# Patient Record
Sex: MALE | Race: WHITE | Employment: FULL TIME | URBAN - METROPOLITAN AREA
[De-identification: names, ages, dates, MRNs, and addresses within clinical notes are randomized per-mention and may not be internally consistent; named-entity substitution may affect disease eponyms.]

---

## 2020-01-28 ENCOUNTER — OFFICE VISIT (OUTPATIENT)
Dept: CARDIOLOGY CLINIC | Age: 50
End: 2020-01-28
Payer: COMMERCIAL

## 2020-01-28 VITALS
WEIGHT: 132 LBS | SYSTOLIC BLOOD PRESSURE: 122 MMHG | DIASTOLIC BLOOD PRESSURE: 75 MMHG | BODY MASS INDEX: 22.53 KG/M2 | HEIGHT: 64 IN | HEART RATE: 67 BPM

## 2020-01-28 PROCEDURE — 99203 OFFICE O/P NEW LOW 30 MIN: CPT | Performed by: INTERNAL MEDICINE

## 2020-01-28 NOTE — PROGRESS NOTES
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 He has had several episodes at work when he had mild headache and when he checked his BP, he noted high diastolic readings of 85 to 90 mmHg. He has been trying to lose weight and has been on a diet and lost 3 lbs in last 2 weeks. He denies having any chest pain, however has experienced occasional SOB with strenuous activities. He thinks this could be related to deconditioning and weight gain. Denies any syncope. No history of Etoh or smoking. Denies any LE swelling. He has had colonoscopy screening last year which was normal.     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 He is physically active and practices full time. He participate in Meditation sessions regularly. PMH:   None      PSH:   None     Allergies: Allergies not on file    Social History:  Denies smoking or alcohol use. Family History:  Reviewed. Father had CAD at age 76s and HTN. Mother had high triglyceride. Review of System:  All other systems reviewed except for that noted above.  Pertinent negatives and positives are:       General: negative for fever, chills    Ophthalmic ROS: negative for - eye pain or loss of vision   ENT ROS: negative for - headaches, sore throat    Respiratory: negative for - cough, sputum   Cardiovascular: Reviewed in HPI   Gastrointestinal: negative for - abdominal pain, diarrhea, N/V   Hematology: negative for - bleeding, blood clots, bruising or jaundice   Genito-Urinary:  negative for - Dysuria or incontinence   Musculoskeletal: negative for - Joint swelling, muscle pain   Neurological: negative for - confusion, dizziness, headaches    Psychiatric: No anxiety,

## 2020-02-07 ENCOUNTER — OFFICE VISIT (OUTPATIENT)
Dept: INTERNAL MEDICINE CLINIC | Age: 50
End: 2020-02-07
Payer: COMMERCIAL

## 2020-02-07 VITALS
HEART RATE: 63 BPM | SYSTOLIC BLOOD PRESSURE: 134 MMHG | WEIGHT: 137 LBS | BODY MASS INDEX: 23.39 KG/M2 | HEIGHT: 64 IN | DIASTOLIC BLOOD PRESSURE: 82 MMHG

## 2020-02-07 DIAGNOSIS — R03.0 ELEVATED BLOOD PRESSURE READING IN OFFICE WITHOUT DIAGNOSIS OF HYPERTENSION: ICD-10-CM

## 2020-02-07 DIAGNOSIS — Z13.1 DIABETES MELLITUS SCREENING: ICD-10-CM

## 2020-02-07 DIAGNOSIS — Z12.5 PROSTATE CANCER SCREENING: ICD-10-CM

## 2020-02-07 DIAGNOSIS — Z00.00 ROUTINE ADULT HEALTH MAINTENANCE: ICD-10-CM

## 2020-02-07 LAB
A/G RATIO: 1.9 (ref 1.1–2.2)
ALBUMIN SERPL-MCNC: 4.8 G/DL (ref 3.4–5)
ALP BLD-CCNC: 73 U/L (ref 40–129)
ALT SERPL-CCNC: 25 U/L (ref 10–40)
ANION GAP SERPL CALCULATED.3IONS-SCNC: 12 MMOL/L (ref 3–16)
AST SERPL-CCNC: 18 U/L (ref 15–37)
BASOPHILS ABSOLUTE: 0 K/UL (ref 0–0.2)
BASOPHILS RELATIVE PERCENT: 0.7 %
BILIRUB SERPL-MCNC: 0.4 MG/DL (ref 0–1)
BUN BLDV-MCNC: 13 MG/DL (ref 7–20)
CALCIUM SERPL-MCNC: 9.6 MG/DL (ref 8.3–10.6)
CHLORIDE BLD-SCNC: 101 MMOL/L (ref 99–110)
CHOLESTEROL, TOTAL: 237 MG/DL (ref 0–199)
CO2: 26 MMOL/L (ref 21–32)
CREAT SERPL-MCNC: 0.7 MG/DL (ref 0.9–1.3)
EOSINOPHILS ABSOLUTE: 0.1 K/UL (ref 0–0.6)
EOSINOPHILS RELATIVE PERCENT: 1.8 %
GFR AFRICAN AMERICAN: >60
GFR NON-AFRICAN AMERICAN: >60
GLOBULIN: 2.5 G/DL
GLUCOSE BLD-MCNC: 89 MG/DL (ref 70–99)
HCT VFR BLD CALC: 45.5 % (ref 40.5–52.5)
HDLC SERPL-MCNC: 44 MG/DL (ref 40–60)
HEMOGLOBIN: 15.4 G/DL (ref 13.5–17.5)
LDL CHOLESTEROL CALCULATED: 164 MG/DL
LYMPHOCYTES ABSOLUTE: 2.5 K/UL (ref 1–5.1)
LYMPHOCYTES RELATIVE PERCENT: 39.1 %
MCH RBC QN AUTO: 30.2 PG (ref 26–34)
MCHC RBC AUTO-ENTMCNC: 33.7 G/DL (ref 31–36)
MCV RBC AUTO: 89.4 FL (ref 80–100)
MONOCYTES ABSOLUTE: 0.4 K/UL (ref 0–1.3)
MONOCYTES RELATIVE PERCENT: 7 %
NEUTROPHILS ABSOLUTE: 3.2 K/UL (ref 1.7–7.7)
NEUTROPHILS RELATIVE PERCENT: 51.4 %
PDW BLD-RTO: 12.9 % (ref 12.4–15.4)
PLATELET # BLD: 213 K/UL (ref 135–450)
PMV BLD AUTO: 9.5 FL (ref 5–10.5)
POTASSIUM SERPL-SCNC: 4.9 MMOL/L (ref 3.5–5.1)
PROSTATE SPECIFIC ANTIGEN: 0.86 NG/ML (ref 0–4)
RBC # BLD: 5.09 M/UL (ref 4.2–5.9)
SODIUM BLD-SCNC: 139 MMOL/L (ref 136–145)
T4 FREE: 1.1 NG/DL (ref 0.9–1.8)
TOTAL PROTEIN: 7.3 G/DL (ref 6.4–8.2)
TRIGL SERPL-MCNC: 143 MG/DL (ref 0–150)
TSH SERPL DL<=0.05 MIU/L-ACNC: 1.82 UIU/ML (ref 0.27–4.2)
VITAMIN D 25-HYDROXY: 35.9 NG/ML
VLDLC SERPL CALC-MCNC: 29 MG/DL
WBC # BLD: 6.3 K/UL (ref 4–11)

## 2020-02-07 PROCEDURE — 90471 IMMUNIZATION ADMIN: CPT | Performed by: INTERNAL MEDICINE

## 2020-02-07 PROCEDURE — 90632 HEPA VACCINE ADULT IM: CPT | Performed by: INTERNAL MEDICINE

## 2020-02-07 PROCEDURE — 99386 PREV VISIT NEW AGE 40-64: CPT | Performed by: INTERNAL MEDICINE

## 2020-02-07 ASSESSMENT — ENCOUNTER SYMPTOMS
VOMITING: 0
NAUSEA: 0
CONSTIPATION: 0
TROUBLE SWALLOWING: 0
DIARRHEA: 0
WHEEZING: 0
STRIDOR: 0
CHOKING: 0
RHINORRHEA: 0
CHEST TIGHTNESS: 0
COUGH: 0
VOICE CHANGE: 0
ABDOMINAL PAIN: 0
ANAL BLEEDING: 0
SORE THROAT: 0
EYE DISCHARGE: 0
BLOOD IN STOOL: 0
SINUS PRESSURE: 0
SHORTNESS OF BREATH: 0

## 2020-02-07 ASSESSMENT — PATIENT HEALTH QUESTIONNAIRE - PHQ9
SUM OF ALL RESPONSES TO PHQ QUESTIONS 1-9: 0
1. LITTLE INTEREST OR PLEASURE IN DOING THINGS: 0
SUM OF ALL RESPONSES TO PHQ QUESTIONS 1-9: 0
SUM OF ALL RESPONSES TO PHQ9 QUESTIONS 1 & 2: 0
2. FEELING DOWN, DEPRESSED OR HOPELESS: 0

## 2020-02-07 NOTE — PROGRESS NOTES
hallucinations. Prior to Visit Medications    Not on File        No Known Allergies    History reviewed. No pertinent past medical history. History reviewed. No pertinent surgical history.     Social History     Socioeconomic History    Marital status: Unknown     Spouse name: Not on file    Number of children: Not on file    Years of education: Not on file    Highest education level: Not on file   Occupational History    Not on file   Social Needs    Financial resource strain: Not on file    Food insecurity:     Worry: Not on file     Inability: Not on file    Transportation needs:     Medical: Not on file     Non-medical: Not on file   Tobacco Use    Smoking status: Never Smoker    Smokeless tobacco: Never Used   Substance and Sexual Activity    Alcohol use: Not on file    Drug use: Not on file    Sexual activity: Not on file   Lifestyle    Physical activity:     Days per week: Not on file     Minutes per session: Not on file    Stress: Not on file   Relationships    Social connections:     Talks on phone: Not on file     Gets together: Not on file     Attends Latter-day service: Not on file     Active member of club or organization: Not on file     Attends meetings of clubs or organizations: Not on file     Relationship status: Not on file    Intimate partner violence:     Fear of current or ex partner: Not on file     Emotionally abused: Not on file     Physically abused: Not on file     Forced sexual activity: Not on file   Other Topics Concern    Not on file   Social History Narrative    Not on file        Family History   Problem Relation Age of Onset    Other Mother     High Blood Pressure Mother     Memory Loss Mother     Diabetes type 2  Mother     Coronary Art Dis Father     Diabetes Father     High Blood Pressure Father     Stroke Father        Vitals:    02/07/20 0741   BP: 134/82   Pulse: 63   Weight: 137 lb (62.1 kg)   Height: 5' 4\" (1.626 m)     Estimated body mass

## 2020-02-08 LAB
ESTIMATED AVERAGE GLUCOSE: 93.9 MG/DL
HBA1C MFR BLD: 4.9 %

## 2020-05-09 LAB
SARS-COV-2: NOT DETECTED
SOURCE: NORMAL

## 2020-09-30 ENCOUNTER — NURSE ONLY (OUTPATIENT)
Dept: INTERNAL MEDICINE CLINIC | Age: 50
End: 2020-09-30
Payer: COMMERCIAL

## 2020-09-30 PROCEDURE — 90632 HEPA VACCINE ADULT IM: CPT | Performed by: INTERNAL MEDICINE

## 2020-09-30 PROCEDURE — 90471 IMMUNIZATION ADMIN: CPT | Performed by: INTERNAL MEDICINE

## 2020-11-05 ENCOUNTER — HOSPITAL ENCOUNTER (OUTPATIENT)
Age: 50
Discharge: HOME | End: 2020-11-05
Payer: COMMERCIAL

## 2020-11-05 LAB
ANION GAP SERPL CALCULATED.3IONS-SCNC: 8 MMOL/L (ref 3–16)
BUN BLDV-MCNC: 15 MG/DL (ref 7–20)
CALCIUM SERPL-MCNC: 9.4 MG/DL (ref 8.3–10.6)
CHLORIDE BLD-SCNC: 105 MMOL/L (ref 99–110)
CHOLESTEROL, TOTAL: 198 MG/DL (ref 0–199)
CO2: 27 MMOL/L (ref 21–32)
CREAT SERPL-MCNC: 0.7 MG/DL (ref 0.9–1.3)
GFR AFRICAN AMERICAN: >60
GFR NON-AFRICAN AMERICAN: >60
GLUCOSE BLD-MCNC: 82 MG/DL (ref 70–99)
HDLC SERPL-MCNC: 35 MG/DL (ref 40–60)
LDL CHOLESTEROL CALCULATED: 130 MG/DL
POTASSIUM SERPL-SCNC: 5.2 MMOL/L (ref 3.5–5.1)
SODIUM BLD-SCNC: 140 MMOL/L (ref 136–145)
TRIGL SERPL-MCNC: 164 MG/DL (ref 0–150)
VLDLC SERPL CALC-MCNC: 33 MG/DL

## 2020-11-05 PROCEDURE — 80048 BASIC METABOLIC PNL TOTAL CA: CPT

## 2020-11-05 PROCEDURE — 80061 LIPID PANEL: CPT

## 2020-11-05 PROCEDURE — 36415 COLL VENOUS BLD VENIPUNCTURE: CPT

## 2021-04-29 DIAGNOSIS — Z13.9 ENCOUNTER FOR MEDICAL SCREENING EXAMINATION: ICD-10-CM

## 2021-04-29 DIAGNOSIS — R03.0 ELEVATED BLOOD PRESSURE READING IN OFFICE WITHOUT DIAGNOSIS OF HYPERTENSION: Primary | ICD-10-CM

## 2021-04-30 ENCOUNTER — HOSPITAL ENCOUNTER (OUTPATIENT)
Age: 51
Discharge: HOME | End: 2021-04-30
Payer: COMMERCIAL

## 2021-04-30 DIAGNOSIS — R03.0 ELEVATED BLOOD PRESSURE READING IN OFFICE WITHOUT DIAGNOSIS OF HYPERTENSION: ICD-10-CM

## 2021-04-30 DIAGNOSIS — Z13.9 ENCOUNTER FOR MEDICAL SCREENING EXAMINATION: ICD-10-CM

## 2021-04-30 LAB
ALBUMIN SERPL-MCNC: 4.5 G/DL (ref 3.4–5)
ALP BLD-CCNC: 69 U/L (ref 40–129)
ALT SERPL-CCNC: 56 U/L (ref 10–40)
ANION GAP SERPL CALCULATED.3IONS-SCNC: 11 MMOL/L (ref 3–16)
AST SERPL-CCNC: 30 U/L (ref 15–37)
BASOPHILS ABSOLUTE: 0 K/UL (ref 0–0.2)
BASOPHILS RELATIVE PERCENT: 0.6 %
BILIRUB SERPL-MCNC: 0.4 MG/DL (ref 0–1)
BILIRUBIN DIRECT: <0.2 MG/DL (ref 0–0.3)
BILIRUBIN, INDIRECT: ABNORMAL MG/DL (ref 0–1)
BUN BLDV-MCNC: 10 MG/DL (ref 7–20)
CALCIUM SERPL-MCNC: 9.4 MG/DL (ref 8.3–10.6)
CHLORIDE BLD-SCNC: 102 MMOL/L (ref 99–110)
CHOLESTEROL, TOTAL: 157 MG/DL (ref 0–199)
CO2: 25 MMOL/L (ref 21–32)
CREAT SERPL-MCNC: 0.8 MG/DL (ref 0.9–1.3)
EOSINOPHILS ABSOLUTE: 0.1 K/UL (ref 0–0.6)
EOSINOPHILS RELATIVE PERCENT: 2.2 %
GFR AFRICAN AMERICAN: >60
GFR NON-AFRICAN AMERICAN: >60
GLUCOSE BLD-MCNC: 94 MG/DL (ref 70–99)
HCT VFR BLD CALC: 43 % (ref 40.5–52.5)
HDLC SERPL-MCNC: 40 MG/DL (ref 40–60)
HEMOGLOBIN: 14.7 G/DL (ref 13.5–17.5)
LDL CHOLESTEROL CALCULATED: 89 MG/DL
LYMPHOCYTES ABSOLUTE: 2.4 K/UL (ref 1–5.1)
LYMPHOCYTES RELATIVE PERCENT: 35 %
MCH RBC QN AUTO: 29.9 PG (ref 26–34)
MCHC RBC AUTO-ENTMCNC: 34.2 G/DL (ref 31–36)
MCV RBC AUTO: 87.4 FL (ref 80–100)
MONOCYTES ABSOLUTE: 0.5 K/UL (ref 0–1.3)
MONOCYTES RELATIVE PERCENT: 6.8 %
NEUTROPHILS ABSOLUTE: 3.8 K/UL (ref 1.7–7.7)
NEUTROPHILS RELATIVE PERCENT: 55.4 %
PDW BLD-RTO: 13.4 % (ref 12.4–15.4)
PLATELET # BLD: 205 K/UL (ref 135–450)
PMV BLD AUTO: 9.2 FL (ref 5–10.5)
POTASSIUM SERPL-SCNC: 4.7 MMOL/L (ref 3.5–5.1)
RBC # BLD: 4.92 M/UL (ref 4.2–5.9)
SODIUM BLD-SCNC: 138 MMOL/L (ref 136–145)
TOTAL PROTEIN: 7.1 G/DL (ref 6.4–8.2)
TRIGL SERPL-MCNC: 141 MG/DL (ref 0–150)
TSH REFLEX: 1.38 UIU/ML (ref 0.27–4.2)
VITAMIN D 25-HYDROXY: 35.5 NG/ML
VLDLC SERPL CALC-MCNC: 28 MG/DL
WBC # BLD: 6.9 K/UL (ref 4–11)

## 2021-04-30 PROCEDURE — 84443 ASSAY THYROID STIM HORMONE: CPT

## 2021-04-30 PROCEDURE — 80076 HEPATIC FUNCTION PANEL: CPT

## 2021-04-30 PROCEDURE — 83036 HEMOGLOBIN GLYCOSYLATED A1C: CPT

## 2021-04-30 PROCEDURE — 36415 COLL VENOUS BLD VENIPUNCTURE: CPT

## 2021-04-30 PROCEDURE — 80048 BASIC METABOLIC PNL TOTAL CA: CPT

## 2021-04-30 PROCEDURE — 80061 LIPID PANEL: CPT

## 2021-04-30 PROCEDURE — 82306 VITAMIN D 25 HYDROXY: CPT

## 2021-04-30 PROCEDURE — 85025 COMPLETE CBC W/AUTO DIFF WBC: CPT

## 2021-05-01 LAB
ESTIMATED AVERAGE GLUCOSE: 99.7 MG/DL
HBA1C MFR BLD: 5.1 %

## 2021-05-27 ENCOUNTER — OFFICE VISIT (OUTPATIENT)
Dept: ORTHOPEDIC SURGERY | Age: 51
End: 2021-05-27
Payer: COMMERCIAL

## 2021-05-27 ENCOUNTER — TELEPHONE (OUTPATIENT)
Dept: ORTHOPEDIC SURGERY | Age: 51
End: 2021-05-27

## 2021-05-27 VITALS — HEIGHT: 64 IN | BODY MASS INDEX: 25.44 KG/M2 | WEIGHT: 149 LBS

## 2021-05-27 DIAGNOSIS — G89.29 CHRONIC PAIN IN LEFT FOOT: ICD-10-CM

## 2021-05-27 DIAGNOSIS — M79.672 CHRONIC PAIN IN LEFT FOOT: ICD-10-CM

## 2021-05-27 DIAGNOSIS — M25.561 CHRONIC PAIN OF RIGHT KNEE: ICD-10-CM

## 2021-05-27 DIAGNOSIS — G89.29 CHRONIC PAIN OF RIGHT KNEE: ICD-10-CM

## 2021-05-27 DIAGNOSIS — M67.51 SYNOVIAL PLICA SYNDROME OF RIGHT KNEE: Primary | ICD-10-CM

## 2021-05-27 PROCEDURE — 99204 OFFICE O/P NEW MOD 45 MIN: CPT | Performed by: PHYSICIAN ASSISTANT

## 2021-05-27 NOTE — PROGRESS NOTES
Patient Titi Soria  Medical Record Number: [id number]  YOB: 1970  Date of Encounter: 5/27/2021     Chief Complaint   Patient presents with    Knee Pain     New, RT knee pain and LT foot pain. pain for 1-2 years and worse after work outs.  Foot Pain       History of Present Illness:  Radha Fagan is a 44 y.o. male here for evaluation of his right knee. His pain assessment is documented below and I reviewed this with him today. Patient states he has been having intermittent right knee pain for several years. He states he frequently meditates for several hours at a time and quickly develops medial right knee pain and burning. Patient is very active and works out regularly. He states his workouts most recently have been somewhat high intensity and more of a Cross[employer] style workout. He was doing [employer] last week when his right knee pain worsened. He states that evening he had some swelling and \"tightness\" inside the knee. Pain does improve with rest.  He does have some clicking of the right knee on occasion but denies locking or catching. Patient also presents complaining of left great toe and second toe pain he has had for many years. He states this pain is also worse after physical activity. He states his left great toe has become progressively more stiff. Pain Assessment  Location of Pain: Knee (foot- sharp pains)  Location Modifiers: Right  Severity of Pain: 5  Quality of Pain: Dull (clicking)  Duration of Pain: A few hours  Frequency of Pain: Intermittent  Aggravating Factors: Exercise, Walking  Limiting Behavior: Some  Relieving Factors: Rest  Result of Injury: No  Work-Related Injury: No  Are there other pain locations you wish to document?: No    History reviewed. No pertinent past medical history. History reviewed. No pertinent surgical history. No current outpatient medications on file. No current facility-administered medications for this visit.      Allergies, with normal tandem gait without limp. Normal strides       On examination of patient's left foot there is no swelling or joint effusion. He denies tenderness on palpation of the left foot. Patient does have moderate stiffness with range of motion testing of the first MTP joint. Radiology:  X-rays obtained today and reviewed in office:    Views: 4 view right knee with comparison AP, flexion PA and skyline views of the left knee  Impression: No evidence for acute fracture or subluxation / dislocation. There are no loose bodies appreciated. There is no evidence of tibiofemoral subluxation. There is minimal loss of joint space. Views: 3 view left foot including AP, lateral and oblique  Impression: There are no acute fractures. There are no lytic or blastic lesions. There is no soft tissue swelling. Impression:  Encounter Diagnoses   Name Primary? 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    Chronic pain of right knee     Chronic pain in left foot        Office Procedures:  Orders Placed This Encounter   Procedures    XR FOOT LEFT (MIN 3 VIEWS)     Standing Status:   Future     Number of Occurrences:   1     Standing Expiration Date:   6/27/2021    XR KNEE BILATERAL STANDING     Standing Status:   Future     Number of Occurrences:   1     Standing Expiration Date:   6/27/2021    XR KNEE RIGHT (3 VIEWS)     Standing Status:   Future     Number of Occurrences:   1     Standing Expiration Date:   6/27/2021    MRI KNEE RIGHT WO CONTRAST     [facility]     Standing Status:   Future     Standing Expiration Date:   5/27/2022       Treatment Plan:          Patient presented today with a chief complaint of acutely worsening chronic right knee pain. Pain worsened after doing Cross[employer] style workouts including [employer] last week. X-rays and physical exam are unremarkable. 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 This will be ordered.     Patient has also had chronic left foot pain that centers around the first MTP joint and has associated stiffness. X-rays are unremarkable. He will be sent to Dr. Judy Ocasio for further evaluation and treatment. Radha Fagan was informed of the results of any imaging. We discussed treatment options and a time was given to answer questions. A plan was proposed and Tariq Delgado understand and accepts this course of care. Electronically signed by Rafael Mckinnon PA-C on 3/36/7998  Board Certified [facility]    Please note that portions of this note were completed with a voice recognition program.  Efforts were made to edit the dictations but occasionally words are mis-transcribed.

## 2021-06-02 ENCOUNTER — HOSPITAL ENCOUNTER (OUTPATIENT)
Dept: MRI IMAGING | Age: 51
Discharge: HOME | End: 2021-06-02
Payer: COMMERCIAL

## 2021-06-02 DIAGNOSIS — M67.51 SYNOVIAL PLICA SYNDROME OF RIGHT KNEE: ICD-10-CM

## 2021-06-02 PROCEDURE — 73721 MRI JNT OF LWR EXTRE W/O DYE: CPT

## 2021-06-03 ENCOUNTER — TELEPHONE (OUTPATIENT)
Dept: ORTHOPEDIC SURGERY | Age: 51
End: 2021-06-03

## 2021-06-03 NOTE — TELEPHONE ENCOUNTER
Would you like an in person appt with Dr. Ba Suarez for MRI results, or we give them over the phone with treatment plan?

## 2021-06-03 NOTE — TELEPHONE ENCOUNTER
Can you help patient schedule an appointment with Dr. Erika Sheriff to discuss MRI results and treatment options?  Thanks

## 2021-11-17 ENCOUNTER — OFFICE VISIT (OUTPATIENT)
Dept: PULMONOLOGY | Age: 51
End: 2021-11-17
Payer: COMMERCIAL

## 2021-11-17 VITALS
SYSTOLIC BLOOD PRESSURE: 110 MMHG | WEIGHT: 152 LBS | HEART RATE: 66 BPM | BODY MASS INDEX: 25.95 KG/M2 | DIASTOLIC BLOOD PRESSURE: 66 MMHG | OXYGEN SATURATION: 99 % | HEIGHT: 64 IN

## 2021-11-17 DIAGNOSIS — G47.33 OSA (OBSTRUCTIVE SLEEP APNEA): Primary | ICD-10-CM

## 2021-11-17 PROCEDURE — 99204 OFFICE O/P NEW MOD 45 MIN: CPT | Performed by: INTERNAL MEDICINE

## 2021-11-17 ASSESSMENT — SLEEP AND FATIGUE QUESTIONNAIRES
HOW LIKELY ARE YOU TO NOD OFF OR FALL ASLEEP WHILE SITTING INACTIVE IN A PUBLIC PLACE: 1
ESS TOTAL SCORE: 10
HOW LIKELY ARE YOU TO NOD OFF OR FALL ASLEEP WHILE SITTING AND TALKING TO SOMEONE: 1
HOW LIKELY ARE YOU TO NOD OFF OR FALL ASLEEP WHILE LYING DOWN TO REST IN THE AFTERNOON WHEN CIRCUMSTANCES PERMIT: 2
HOW LIKELY ARE YOU TO NOD OFF OR FALL ASLEEP WHEN YOU ARE A PASSENGER IN A CAR FOR AN HOUR WITHOUT A BREAK: 1
HOW LIKELY ARE YOU TO NOD OFF OR FALL ASLEEP IN A CAR, WHILE STOPPED FOR A FEW MINUTES IN TRAFFIC: 1
HOW LIKELY ARE YOU TO NOD OFF OR FALL ASLEEP WHILE SITTING QUIETLY AFTER LUNCH WITHOUT ALCOHOL: 2
HOW LIKELY ARE YOU TO NOD OFF OR FALL ASLEEP WHILE WATCHING TV: 1
HOW LIKELY ARE YOU TO NOD OFF OR FALL ASLEEP WHILE SITTING AND READING: 1

## 2021-11-17 NOTE — PROGRESS NOTES
PULMONARY OFFICE NEW PATIENT VISIT    CONSULTING PHYSICIAN:  Christopher Brady MD     REASON FOR VISIT:   Chief Complaint   Patient presents with    Snoring    Daytime Sleepiness       DATE OF VISIT: 11/17/2021    HISTORY OF PRESENT ILLNESS: 46y.o. year old male comes into the sleep clinic for the first time. 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  Reports that for the last several years he has been having poor quality sleep with snoring, gasping, choking at nighttime. Sleep remains disturbed and he wakes up tired. Feels sleepy throughout the day. As per him, he has gained about 10 pounds over the last few years. Sleep Medicine 11/17/2021   Sitting and reading 1   Watching TV 1   Sitting, inactive in a public place (e.g. a theatre or a meeting) 1   As a passenger in a car for an hour without a break 1   Lying down to rest in the afternoon when circumstances permit 2   Sitting and talking to someone 1   Sitting quietly after a lunch without alcohol 2   In a car, while stopped for a few minutes in traffic 1   Total score 10       STOP-BANG Questionnaire    Snore Loudly Yes   Often feel Tired/Fatigued/Sleepy Yes   Observed breathing pauses No   Blood pressure problems No   BMI >35 Kg/m2 Body mass index is 26.09 kg/m². Age>50 46 y.o. Neck Circumference>16 inches      Gender Male? male     Total 4       REVIEW OF SYSTEMS:   CONSTITUTIONAL SYMPTOMS: The patient denies fever, fatigue, night sweats, weight loss or weight gain. HEENT: No vision changes. No tinnitus, Denies sinus pain. No hoarseness, or dysphagia. NECK: Patient denies swelling in the neck. CARDIOVASCULAR: Denies chest pain, palpitation, syncope. RESPIRATORY: Denies shortness of breath or cough. GASTROINTESTINAL: Denies nausea, abdominal pain or change in bowel function. GENITOURINARY: Denies obstructive symptoms. No history of incontinence. BREASTS: No masses or lumps in the breasts. SKIN: No rashes or itching. MUSCULOSKELETAL: Denies weakness or bone pain. NEUROLOGICAL: No headaches or seizures. PSYCHIATRIC: Denies mood swings or depression. ENDOCRINE: Denies heat or cold intolerance or excessive thirst.  HEMATOLOGIC/LYMPHATIC: Denies easy bruising or lymph node swelling. ALLERGIC/IMMUNOLOGIC: No environmental allergies. PAST MEDICAL HISTORY: No past medical history on file. PAST SURGICAL HISTORY: No past surgical history on file. SOCIAL HISTORY:   Social History     Tobacco Use    Smoking status: Never Smoker    Smokeless tobacco: Never Used   Vaping Use    Vaping Use: Never used   Substance Use Topics    Alcohol use: Not on file    Drug use: Not on file       FAMILY HISTORY:   Family History   Problem Relation Age of Onset    Diabetes Mother     Other Mother     High Blood Pressure Mother     Memory Loss Mother     Diabetes type 2  Mother     Coronary Art Dis Father     Diabetes Father     High Blood Pressure Father     Stroke Father     Osteoarthritis Other        MEDICATIONS:     No current outpatient medications on file prior to visit. No current facility-administered medications on file prior to visit. ALLERGIES:   Allergies as of 11/17/2021    (No Known Allergies)      OBJECTIVE:   height is 5' 4\" (1.626 m) and weight is 152 lb (68.9 kg). His blood pressure is 110/66 and his pulse is 66. His oxygen saturation is 99%. PHYSICAL EXAM:    CONSTITUTIONAL: He is a 46y.o.-year-old who appears his stated age. He is alert and oriented x 3 and in no acute distress. HEENT: PERRLA, EOMI. No scleral icterus. No thrush, atraumatic, normocephalic. Mallampati class 1 airway. NECK: Supple, without cervical or supraclavicular lymphadenopathy:  CARDIOVASCULAR: S1 S2 RRR. Without murmer  RESPIRATORY & CHEST: Lungs are clear to auscultation and percussion. No wheezing, no crackles.  Good air movement  GASTROINTESTINAL & ABDOMEN: Soft, nontender, positive bowel sounds in all quadrants, non-distended, without hepatosplenomegaly. GENITOURINARY: Deferred. MUSCULOSKELETAL: No tenderness to palpation of the axial skeleton. There is no clubbing. No cyanosis. No edema of the lower extremities. SKIN OF BODY: No rash or jaundice. PSYCHIATRIC EVALUATION: Normal affect. Patient answers questions appropriately. HEMATOLOGIC/LYMPHATIC/ IMMUNOLOGIC: No palpable lymphadenopathy. NEUROLOGIC: Alert and oriented x 3. Groslly non-focal. Motor strength is 5+/5 in all muscle groups. The patient has a normal sensorium globally. LABS:    Lab Summary Latest Ref Rng & Units 4/30/2021 11/5/2020   WBC 4.0 - 11.0 K/uL 6.9 -   Hgb 13.5 - 17.5 g/dL 14.7 -   Hct 40.5 - 52.5 % 43.0 -   Platelets 821 - 963 K/uL 205 -   Sodium 136 - 145 mmol/L 138 140   Potassium 3.5 - 5.1 mmol/L 4.7 5.2(H)   BUN 7 - 20 mg/dL 10 15   Creatinine 0.9 - 1.3 mg/dL 0.8(L) 0.7(L)   Glucose 70 - 99 mg/dL 94 82   Calcium 8.3 - 10.6 mg/dL 9.4 9.4   Alk Phos 40 - 129 U/L 69 -   Albumin 3.4 - 5.0 g/dL 4.5 -   Bilirubin 0.0 - 1.0 mg/dL 0.4 -   Bilirubin, Dir 0.0 - 0.3 mg/dL <0.2 -   Bilirubin, Ind 0.0 - 1.0 mg/dL see below -   AST 15 - 37 U/L 30 -   ALT 10 - 40 U/L 56(H) -   HDL cholesterol 40 - 60 mg/dL 40 35(L)   Triglycerides 0 - 150 mg/dL 141 164(H)   LDL calc <100 mg/dL 89 130(H)   VLDL calc Not Established mg/dL 28 33   TSH 0.27 - 4.20 uIU/mL 1.38 -   Some recent data might be hidden       All labs were personally reviewed by me any my interpretation is: CBC is normal. Chem 8 is normal.    IMAGING:    No new chest imaging for me to review. Pulmonary Functions Testing Results:        IMPRESSION AND RECOMMENDATIONS:     1. BAMBI (obstructive sleep apnea)  -He has features which are suspicious for obstructive sleep apnea. -I will order for home sleep testing in order to make the diagnosis. If he is seen to have sleep apnea, I will start him on auto titrating CPAP. -Home Sleep Study;  Future      Return in about 3 months (around 2/17/2022) for olga. Wendy Ashby MD  Pulmonary Critical Care and Sleep Medicine  11/17/2021, 12:15 PM    This note was completed using dragon medical speech recognition software. Grammatical errors, random word insertions, pronoun errors and incomplete sentences are occasional consequences of this technology due to software limitations. If there are questions or concerns about the content of this note of information contained within the body of this dictation they should be addressed with the provider for clarification.

## 2021-11-23 ENCOUNTER — HOSPITAL ENCOUNTER (OUTPATIENT)
Dept: SLEEP CENTER | Age: 51
Discharge: HOME | End: 2021-11-23
Payer: COMMERCIAL

## 2021-11-23 DIAGNOSIS — G47.33 OSA (OBSTRUCTIVE SLEEP APNEA): ICD-10-CM

## 2021-11-23 PROCEDURE — 95806 SLEEP STUDY UNATT&RESP EFFT: CPT

## 2021-11-29 DIAGNOSIS — G47.33 OSA (OBSTRUCTIVE SLEEP APNEA): Primary | ICD-10-CM

## 2021-11-29 PROCEDURE — 95806 SLEEP STUDY UNATT&RESP EFFT: CPT | Performed by: INTERNAL MEDICINE

## 2021-12-01 ENCOUNTER — TELEPHONE (OUTPATIENT)
Dept: PULMONOLOGY | Age: 51
End: 2021-12-01

## 2021-12-01 NOTE — TELEPHONE ENCOUNTER
Spoke with patient. HST results discussed.   Informed him that the Sleep Lab will be calling to schedule titration study

## 2021-12-06 ENCOUNTER — HOSPITAL ENCOUNTER (OUTPATIENT)
Dept: SLEEP CENTER | Age: 51
Discharge: HOME | End: 2021-12-06
Payer: COMMERCIAL

## 2021-12-06 DIAGNOSIS — G47.33 OSA (OBSTRUCTIVE SLEEP APNEA): ICD-10-CM

## 2021-12-06 PROCEDURE — 95811 POLYSOM 6/>YRS CPAP 4/> PARM: CPT

## 2021-12-07 PROCEDURE — 95811 POLYSOM 6/>YRS CPAP 4/> PARM: CPT | Performed by: INTERNAL MEDICINE

## 2022-02-17 ENCOUNTER — TELEPHONE (OUTPATIENT)
Dept: PULMONOLOGY | Age: 52
End: 2022-02-17

## 2022-02-17 NOTE — TELEPHONE ENCOUNTER
LM on VM to follow up on CPAP order. Requested they either call pt with update on when he will be receiving his CPAP or call the office to let me know.

## 2022-04-15 ENCOUNTER — HOSPITAL ENCOUNTER (OUTPATIENT)
Dept: GENERAL RADIOLOGY | Age: 52
Discharge: HOME | End: 2022-04-15
Payer: COMMERCIAL

## 2022-04-15 ENCOUNTER — HOSPITAL ENCOUNTER (OUTPATIENT)
Age: 52
Discharge: HOME | End: 2022-04-15
Payer: COMMERCIAL

## 2022-04-15 DIAGNOSIS — S99.921A INJURY OF RIGHT FOOT, INITIAL ENCOUNTER: Primary | ICD-10-CM

## 2022-04-15 DIAGNOSIS — S99.921A INJURY OF RIGHT FOOT, INITIAL ENCOUNTER: ICD-10-CM

## 2022-04-15 PROCEDURE — 73630 X-RAY EXAM OF FOOT: CPT

## 2022-04-15 PROCEDURE — 73610 X-RAY EXAM OF ANKLE: CPT

## 2022-04-26 ENCOUNTER — TELEPHONE (OUTPATIENT)
Dept: PULMONOLOGY | Age: 52
End: 2022-04-26

## 2022-04-26 NOTE — TELEPHONE ENCOUNTER
Patient stopped into office due to not having machine yet and needing to reschedule appointment. Sent order to [facility] and left message with them to follow-up with patient. [facility].

## 2022-06-04 ENCOUNTER — HOSPITAL ENCOUNTER (EMERGENCY)
Age: 52
Discharge: HOME | End: 2022-06-04
Attending: EMERGENCY MEDICINE
Payer: COMMERCIAL

## 2022-06-04 VITALS
DIASTOLIC BLOOD PRESSURE: 89 MMHG | SYSTOLIC BLOOD PRESSURE: 144 MMHG | HEART RATE: 79 BPM | WEIGHT: 150 LBS | HEIGHT: 63 IN | TEMPERATURE: 97.6 F | BODY MASS INDEX: 26.58 KG/M2 | OXYGEN SATURATION: 100 % | RESPIRATION RATE: 16 BRPM

## 2022-06-04 DIAGNOSIS — R51.9 NONINTRACTABLE HEADACHE, UNSPECIFIED CHRONICITY PATTERN, UNSPECIFIED HEADACHE TYPE: Primary | ICD-10-CM

## 2022-06-04 LAB
ANION GAP SERPL CALCULATED.3IONS-SCNC: 15 MMOL/L (ref 3–16)
BUN BLDV-MCNC: 10 MG/DL (ref 7–20)
CALCIUM SERPL-MCNC: 9 MG/DL (ref 8.3–10.6)
CHLORIDE BLD-SCNC: 95 MMOL/L (ref 99–110)
CO2: 22 MMOL/L (ref 21–32)
CREAT SERPL-MCNC: 0.8 MG/DL (ref 0.9–1.3)
GFR AFRICAN AMERICAN: >60
GFR NON-AFRICAN AMERICAN: >60
GLUCOSE BLD-MCNC: 91 MG/DL (ref 70–99)
POTASSIUM REFLEX MAGNESIUM: 3.5 MMOL/L (ref 3.5–5.1)
SODIUM BLD-SCNC: 132 MMOL/L (ref 136–145)

## 2022-06-04 PROCEDURE — 83735 ASSAY OF MAGNESIUM: CPT

## 2022-06-04 PROCEDURE — 99283 EMERGENCY DEPT VISIT LOW MDM: CPT

## 2022-06-04 PROCEDURE — 80048 BASIC METABOLIC PNL TOTAL CA: CPT

## 2022-06-05 LAB — MAGNESIUM: 2 MG/DL (ref 1.8–2.4)

## 2022-06-05 NOTE — ED NOTES
Patient prepared for and ready to be discharged. Dressed in clothes and given belongings. Patient discharged at this time in no acute distress after he verbalized understanding of discharge instructions.           Justine Strickland RN  06/04/22 [id number]

## 2022-06-05 NOTE — ED PROVIDER NOTES
[id number] [facility]          ATTENDING PHYSICIAN NOTE       Date of evaluation: 6/4/2022    Chief Complaint     Labs Only  Concern for abnormal sodium    History of Present Illness     Katlyn Cantrell is a 46 y.o. 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 The patient states that he has been fasting for the last several days, drinking only water, and he began to develop a mild posterior headache, which was concerning to him for hyponatremia, given his recent fasting. As result, this afternoon he took salt supplementation, and believes that he took a total of approximately 3 g of salt, and is now somewhat concerned that he may have overcompensated. He is additionally concerned about other potential electrolyte derangements including potassium and magnesium. The patient presented to the emergency department with the sole desire of having his electrolytes checked. Review of Systems     Review of Systems   Complete review of systems was not performed, but patient did not offer any additional complaints    Past Medical, Surgical, Family, and Social History     He has no past medical history on file. He has no past surgical history on file. His family history includes Coronary Art Dis in his father; Diabetes in his father and mother; Diabetes type 2  in his mother; High Blood Pressure in his father and mother; Memory Loss in his mother; Osteoarthritis in an other family member; Other in his mother; Stroke in his father. He reports that he has never smoked. He has never used smokeless tobacco.    Medications     There are no discharge medications for this patient. Allergies     He has No Known Allergies.     Physical Exam     INITIAL VITALS: BP: (!) 144/89, Temp: 97.6 °F (36.4 °C), Heart Rate: 79, Resp: 16, SpO2: 100 %     Thorough physical examination was not performed, but the following were noted:    General: Well appearing. Pleasantly conversational, and in NAD. HEENT: Pupils are equal, round, and reactive to light. Extraocular muscles are intact. Conjunctivae are clear and moist. No redness or drainage from the eyes. Neck: Supple, with full range of motion. Respiratory: Unlabored breathing with equal chest rise and fall. Neuro: Alert and oriented x3. No focal neurologic deficits are noted. Extremities: The patient moves all extremities equally. Psych: The patient appears mildly anxious, but otherwise mood and affect are generally within normal limits for their presentation. Diagnostic Results       RADIOLOGY:  No orders to display       LABS:   Results for orders placed or performed during the hospital encounter of 14/17/95   Basic Metabolic Panel w/ Reflex to MG   Result Value Ref Range    Sodium 132 (L) 136 - 145 mmol/L    Potassium reflex Magnesium 3.5 3.5 - 5.1 mmol/L    Chloride 95 (L) 99 - 110 mmol/L    CO2 22 21 - 32 mmol/L    Anion Gap 15 3 - 16    Glucose 91 70 - 99 mg/dL    BUN 10 7 - 20 mg/dL    CREATININE 0.8 (L) 0.9 - 1.3 mg/dL    GFR Non-African American >60 >60    GFR African American >60 >60    Calcium 9.0 8.3 - 10.6 mg/dL   Magnesium   Result Value Ref Range    Magnesium 2.00 1.80 - 2.40 mg/dL       RECENT VITALS:  BP: (!) 144/89, Temp: 97.6 °F (36.4 °C), Heart Rate: 79, Resp: 16, SpO2: 100 %     Procedures       ED Course     Nursing Notes, Past Medical Hx, Past Surgical Hx, Social Hx, Allergies, and Family Hx were reviewed. The patient was given the following medications:  No orders of the defined types were placed in this encounter. CONSULTS:  None    MEDICAL DECISION MAKING / ASSESSMENT / Keon Xavier is a 46 y.o. male, generally healthy, a nephrology specialist associated with our hospital, who presents to the emergency department specifically requesting a basic metabolic panel, due to concern for hyponatremia.   The patient has been deliberately fasting for several days, drinking only water, and has developed a headache and some fatigue, concerning for hyponatremia. He did supplement with salt, but would prefer to know his sodium level in order to make informed decisions regarding ongoing supplementation. The patient was briefly registered so that the order could be placed, and very briefly roomed so that nursing staff could obtain a set of vital signs, which are generally reassuring aside from mild hypertension, and straight stick for a basic metabolic panel. The patient does not wish any further evaluation. He wishes to return home and plans on following up on the results of his basic metabolic panel in [facility], and will make further management decisions from there. He was encouraged to return to the emergency department for more thorough evaluation should he feel any worsening of symptoms or any desire to do so. Clinical Impression     1. Nonintractable headache, unspecified chronicity pattern, unspecified headache type        Disposition     PATIENT REFERRED TO:  Luis Quiñones [facility] [address] [facility]  [phone]    Call in 1 day  To discuss your ER visit, and arrange a follow-up appointment      DISCHARGE MEDICATIONS:  There are no discharge medications for this patient. DISPOSITION Decision To Discharge    (Please note that portions of this note were completed with voice recognition software.   Efforts were made to edit the dictations but occasionally words are mis-transcribed.)     Geovany Segal MD  06/05/22 1590

## 2022-07-01 LAB
CHOLESTEROL, TOTAL: 125 MG/DL (ref 0–199)
GLUCOSE BLD-MCNC: 89 MG/DL (ref 70–99)
HDLC SERPL-MCNC: 29 MG/DL (ref 40–60)
LDL CHOLESTEROL CALCULATED: 68 MG/DL
TRIGL SERPL-MCNC: 141 MG/DL (ref 0–150)

## 2022-08-17 ENCOUNTER — HOSPITAL ENCOUNTER (OUTPATIENT)
Age: 52
Discharge: HOME | End: 2022-08-17
Payer: COMMERCIAL

## 2022-08-17 LAB
BUN BLDV-MCNC: 15 MG/DL (ref 7–20)
CREAT SERPL-MCNC: 0.7 MG/DL (ref 0.9–1.3)
GFR AFRICAN AMERICAN: >60
GFR NON-AFRICAN AMERICAN: >60
HCT VFR BLD CALC: 40.3 % (ref 40.5–52.5)
HEMOGLOBIN: 13.6 G/DL (ref 13.5–17.5)
MCH RBC QN AUTO: 30 PG (ref 26–34)
MCHC RBC AUTO-ENTMCNC: 33.7 G/DL (ref 31–36)
MCV RBC AUTO: 88.9 FL (ref 80–100)
PDW BLD-RTO: 14 % (ref 12.4–15.4)
PLATELET # BLD: 213 K/UL (ref 135–450)
PMV BLD AUTO: 9 FL (ref 5–10.5)
RBC # BLD: 4.54 M/UL (ref 4.2–5.9)
TSH SERPL DL<=0.05 MIU/L-ACNC: 1.33 UIU/ML (ref 0.27–4.2)
WBC # BLD: 6.8 K/UL (ref 4–11)

## 2022-08-17 PROCEDURE — 36415 COLL VENOUS BLD VENIPUNCTURE: CPT

## 2022-08-17 PROCEDURE — 83036 HEMOGLOBIN GLYCOSYLATED A1C: CPT

## 2022-08-17 PROCEDURE — 84153 ASSAY OF PSA TOTAL: CPT

## 2022-08-17 PROCEDURE — 82565 ASSAY OF CREATININE: CPT

## 2022-08-17 PROCEDURE — 84443 ASSAY THYROID STIM HORMONE: CPT

## 2022-08-17 PROCEDURE — 85027 COMPLETE CBC AUTOMATED: CPT

## 2022-08-17 PROCEDURE — 84520 ASSAY OF UREA NITROGEN: CPT

## 2022-08-18 ENCOUNTER — OFFICE VISIT (OUTPATIENT)
Dept: PULMONOLOGY | Age: 52
End: 2022-08-18
Payer: COMMERCIAL

## 2022-08-18 VITALS
HEART RATE: 89 BPM | OXYGEN SATURATION: 98 % | HEIGHT: 63 IN | DIASTOLIC BLOOD PRESSURE: 60 MMHG | SYSTOLIC BLOOD PRESSURE: 110 MMHG | WEIGHT: 150 LBS | BODY MASS INDEX: 26.58 KG/M2

## 2022-08-18 DIAGNOSIS — G47.33 OSA (OBSTRUCTIVE SLEEP APNEA): Primary | ICD-10-CM

## 2022-08-18 LAB
ESTIMATED AVERAGE GLUCOSE: 96.8 MG/DL
HBA1C MFR BLD: 5 %
PROSTATE SPECIFIC ANTIGEN: 1.26 NG/ML (ref 0–4)

## 2022-08-18 PROCEDURE — 99213 OFFICE O/P EST LOW 20 MIN: CPT | Performed by: INTERNAL MEDICINE

## 2022-08-18 ASSESSMENT — SLEEP AND FATIGUE QUESTIONNAIRES
HOW LIKELY ARE YOU TO NOD OFF OR FALL ASLEEP WHILE SITTING AND TALKING TO SOMEONE: 0
HOW LIKELY ARE YOU TO NOD OFF OR FALL ASLEEP WHILE SITTING QUIETLY AFTER LUNCH WITHOUT ALCOHOL: 0
HOW LIKELY ARE YOU TO NOD OFF OR FALL ASLEEP WHEN YOU ARE A PASSENGER IN A CAR FOR AN HOUR WITHOUT A BREAK: 1
HOW LIKELY ARE YOU TO NOD OFF OR FALL ASLEEP WHILE LYING DOWN TO REST IN THE AFTERNOON WHEN CIRCUMSTANCES PERMIT: 0
ESS TOTAL SCORE: 1
HOW LIKELY ARE YOU TO NOD OFF OR FALL ASLEEP IN A CAR, WHILE STOPPED FOR A FEW MINUTES IN TRAFFIC: 0
HOW LIKELY ARE YOU TO NOD OFF OR FALL ASLEEP WHILE WATCHING TV: 0
HOW LIKELY ARE YOU TO NOD OFF OR FALL ASLEEP WHILE SITTING INACTIVE IN A PUBLIC PLACE: 0
HOW LIKELY ARE YOU TO NOD OFF OR FALL ASLEEP WHILE SITTING AND READING: 0

## 2022-08-18 NOTE — PROGRESS NOTES
PULMONARY OFFICE FOLLOW-UP VISIT    CONSULTING PHYSICIAN:  Frances Myles MD     REASON FOR VISIT:   Chief Complaint   Patient presents with    Sleep Apnea         DATE OF VISIT: 8/18/2022    HISTORY OF PRESENT ILLNESS: 46y.o. year old male comes into the sleep clinic for a follow-up. He was diagnosed to have severe obstructive sleep apnea and started on auto CPAP therapy. He is using it very regularly and feels benefited from it. Denies any mask leakage or pressure intolerance. Weight has been stable. 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  Reports that for the last several years he has been having poor quality sleep with snoring, gasping, choking at nighttime. Sleep remains disturbed and he wakes up tired. Feels sleepy throughout the day. As per him, he has gained about 10 pounds over the last few years. Sleep Medicine 8/18/2022 11/17/2021   Sitting and reading 0 1   Watching TV 0 1   Sitting, inactive in a public place (e.g. a theatre or a meeting) 0 1   As a passenger in a car for an hour without a break 1 1   Lying down to rest in the afternoon when circumstances permit 0 2   Sitting and talking to someone 0 1   Sitting quietly after a lunch without alcohol 0 2   In a car, while stopped for a few minutes in traffic 0 1   Total score 1 10       STOP-BANG Questionnaire    Snore Loudly Yes   Often feel Tired/Fatigued/Sleepy Yes   Observed breathing pauses No   Blood pressure problems No   BMI >35 Kg/m2 Body mass index is 26.57 kg/m². Age>50 46 y.o. Neck Circumference>16 inches      Gender Male? male     Total 4       REVIEW OF SYSTEMS:   8 point review of system is negative except that mentioned in the HPI. PAST MEDICAL HISTORY: No past medical history on file. PAST SURGICAL HISTORY: No past surgical history on file.     SOCIAL HISTORY:   Social History     Tobacco Use    Smoking status: Never    Smokeless tobacco: Never   Vaping Use    Vaping Use: Never used       FAMILY HISTORY:   Family History   Problem Relation Age of Onset    Diabetes Mother     Other Mother     High Blood Pressure Mother     Memory Loss Mother     Diabetes type 2  Mother     Coronary Art Dis Father     Diabetes Father     High Blood Pressure Father     Stroke Father     Osteoarthritis Other        MEDICATIONS:     No current outpatient medications on file prior to visit. No current facility-administered medications on file prior to visit. ALLERGIES:   Allergies as of 08/18/2022    (No Known Allergies)      OBJECTIVE:   height is 5' 3\" (1.6 m) and weight is 150 lb (68 kg). His blood pressure is 110/60 and his pulse is 89. His oxygen saturation is 98%. PHYSICAL EXAM:    CONSTITUTIONAL: He is a 46y.o.-year-old who appears his stated age. He is alert and oriented x 3 and in no acute distress. HEENT: PERRLA, EOMI. No scleral icterus. No thrush, atraumatic, normocephalic. Mallampati class 1 airway. NECK: Supple, without cervical or supraclavicular lymphadenopathy:  CARDIOVASCULAR: S1 S2 RRR. Without murmer  RESPIRATORY & CHEST: Lungs are clear to auscultation and percussion. No wheezing, no crackles. Good air movement  GASTROINTESTINAL & ABDOMEN: Soft, nontender, positive bowel sounds in all quadrants, non-distended, without hepatosplenomegaly. GENITOURINARY: Deferred. MUSCULOSKELETAL: No tenderness to palpation of the axial skeleton. There is no clubbing. No cyanosis. No edema of the lower extremities. SKIN OF BODY: No rash or jaundice. PSYCHIATRIC EVALUATION: Normal affect. Patient answers questions appropriately. HEMATOLOGIC/LYMPHATIC/ IMMUNOLOGIC: No palpable lymphadenopathy. NEUROLOGIC: Alert and oriented x 3. Groslly non-focal. Motor strength is 5+/5 in all muscle groups. The patient has a normal sensorium globally.       LABS:    Lab Summary Latest Ref Rng & Units 8/17/2022 7/1/2022 6/4/2022   WBC 4.0 - 11.0 K/uL 6.8 - -   Hgb 13.5 - 17.5 g/dL 13.6 - -   Hct 40.5 - 52.5 % 40. 3(L) - -   Platelets 114 - 461 K/uL 213 - -   Sodium 136 - 145 mmol/L - - 132(L)   Potassium 3.5 - 5.1 mmol/L - - 3.5   BUN 7 - 20 mg/dL 15 - 10   Creatinine 0.9 - 1.3 mg/dL 0.7(L) - 0.8(L)   Glucose 70 - 99 mg/dL - 89 91   Calcium 8.3 - 10.6 mg/dL - - 9.0   Magnesium 1.80 - 2.40 mg/dL - - 2.00   Alk Phos 40 - 129 U/L - - -   Albumin 3.4 - 5.0 g/dL - - -   Bilirubin 0.0 - 1.0 mg/dL - - -   Bilirubin, Dir 0.0 - 0.3 mg/dL - - -   Bilirubin, Ind 0.0 - 1.0 mg/dL - - -   AST 15 - 37 U/L - - -   ALT 10 - 40 U/L - - -   HDL cholesterol 40 - 60 mg/dL - 29(L) -   Triglycerides 0 - 150 mg/dL - 141 -   LDL calc <100 mg/dL - 68 -   VLDL calc Not Established mg/dL - - -   TSH 0.27 - 4.20 uIU/mL 1.33 - -   Some recent data might be hidden       All labs were personally reviewed by me any my interpretation is: CBC is normal. Chem 8 is normal.    IMAGING:    No new chest imaging for me to review. Pulmonary Functions Testing Results:    Home sleep testing done on 11/23/2021  Total MEERA: 31.6  Lowest oxygen saturation: 72%  Time below an oxygen saturation of 88%: 17.2 minutes    CPAP titration done on 12/6/2021  Adequate therapy: Auto CPAP 9 to 14 cm H2O with small dream Wear mask    CPAP compliance summary     6/7/2022-8/10/2022   Days with device usage 64/65 days   Average Usage 6 hours 40 minutes   Days with device usage >4hrs 98%   Large Leak per night 6.2 L minute   AHI 2.5   CPAP settings Auto CPAP 9 to 14 cm H2O   90th percentile pressure 10.5 cm H2O   Mask DreamWear mask     DME: [employer]        IMPRESSION AND RECOMMENDATIONS:     1. BAMBI (obstructive sleep apnea)  -He has features which are suspicious for obstructive sleep apnea. -I reviewed his CPAP download myself. His compliance is adequate and residual AHI is within normal limits.  -No changes made to the pressure settings today.   -Advised him to continue changing his mask interface frequently in order to maintain adequate seal.      Return in about 6 months (around 2/18/2023) for olga. Jacy Corley MD  Pulmonary Critical Care and Sleep Medicine  8/18/2022, 12:54 PM    This note was completed using dragon medical speech recognition software. Grammatical errors, random word insertions, pronoun errors and incomplete sentences are occasional consequences of this technology due to software limitations. If there are questions or concerns about the content of this note of information contained within the body of this dictation they should be addressed with the provider for clarification.

## 2022-09-03 ENCOUNTER — HOSPITAL ENCOUNTER (OUTPATIENT)
Dept: MRI IMAGING | Age: 52
Discharge: HOME | End: 2022-09-03
Payer: COMMERCIAL

## 2022-09-03 DIAGNOSIS — M54.16 LUMBAR RADICULOPATHY: ICD-10-CM

## 2022-09-03 PROCEDURE — 72148 MRI LUMBAR SPINE W/O DYE: CPT

## 2022-09-21 ENCOUNTER — HOSPITAL ENCOUNTER (OUTPATIENT)
Age: 52
Discharge: HOME | End: 2022-09-21
Payer: COMMERCIAL

## 2022-09-21 DIAGNOSIS — Z11.9 SCREENING EXAMINATION FOR INFECTIOUS DISEASE: ICD-10-CM

## 2022-09-21 DIAGNOSIS — Z11.9 SCREENING EXAMINATION FOR INFECTIOUS DISEASE: Primary | ICD-10-CM

## 2022-09-21 LAB — HBV SURFACE AB TITR SER: >1000 MIU/ML

## 2022-09-21 PROCEDURE — 86762 RUBELLA ANTIBODY: CPT

## 2022-09-21 PROCEDURE — 86735 MUMPS ANTIBODY: CPT

## 2022-09-21 PROCEDURE — 86765 RUBEOLA ANTIBODY: CPT

## 2022-09-21 PROCEDURE — 86787 VARICELLA-ZOSTER ANTIBODY: CPT

## 2022-09-21 PROCEDURE — 86706 HEP B SURFACE ANTIBODY: CPT

## 2022-09-21 PROCEDURE — 36415 COLL VENOUS BLD VENIPUNCTURE: CPT

## 2022-09-22 LAB
MEASLES IMMUNE (IGG): NORMAL
MUMPS AB IGG: NORMAL
RUBELLA ANTIBODY IGG: NORMAL
VARICELLA-ZOSTER VIRUS AB, IGG: NORMAL

## 2022-10-13 RX ORDER — DOXYCYCLINE HYCLATE 100 MG
100 TABLET ORAL DAILY
Qty: 14 TABLET | Refills: 0 | Status: SHIPPED | OUTPATIENT
Start: 2022-10-13 | End: 2022-10-27

## 2022-10-26 RX ORDER — PANTOPRAZOLE SODIUM 40 MG/1
40 TABLET, DELAYED RELEASE ORAL
Qty: 90 TABLET | Refills: 1 | Status: SHIPPED | OUTPATIENT
Start: 2022-10-26 | End: 2022-11-07

## 2022-10-26 RX ORDER — PANTOPRAZOLE SODIUM 40 MG/1
40 TABLET, DELAYED RELEASE ORAL
Qty: 90 TABLET | Refills: 1 | Status: SHIPPED
Start: 2022-10-26 | End: 2022-10-26 | Stop reason: CLARIF

## 2022-11-07 ENCOUNTER — OFFICE VISIT (OUTPATIENT)
Dept: INTERNAL MEDICINE CLINIC | Age: 52
End: 2022-11-07
Payer: COMMERCIAL

## 2022-11-07 VITALS
TEMPERATURE: 97.8 F | SYSTOLIC BLOOD PRESSURE: 126 MMHG | WEIGHT: 158 LBS | HEART RATE: 95 BPM | DIASTOLIC BLOOD PRESSURE: 80 MMHG | BODY MASS INDEX: 27.99 KG/M2 | OXYGEN SATURATION: 96 %

## 2022-11-07 DIAGNOSIS — N40.1 BENIGN PROSTATIC HYPERPLASIA WITH URINARY RETENTION: ICD-10-CM

## 2022-11-07 DIAGNOSIS — R33.8 BENIGN PROSTATIC HYPERPLASIA WITH URINARY RETENTION: ICD-10-CM

## 2022-11-07 DIAGNOSIS — Z00.00 ROUTINE ADULT HEALTH MAINTENANCE: Primary | ICD-10-CM

## 2022-11-07 DIAGNOSIS — Z12.11 COLON CANCER SCREENING: ICD-10-CM

## 2022-11-07 DIAGNOSIS — M62.08 RECTUS DIASTASIS: ICD-10-CM

## 2022-11-07 DIAGNOSIS — Z12.5 PROSTATE CANCER SCREENING: ICD-10-CM

## 2022-11-07 DIAGNOSIS — Z11.59 NEED FOR HEPATITIS C SCREENING TEST: ICD-10-CM

## 2022-11-07 DIAGNOSIS — Z11.4 SCREENING FOR HUMAN IMMUNODEFICIENCY VIRUS: ICD-10-CM

## 2022-11-07 PROCEDURE — 99213 OFFICE O/P EST LOW 20 MIN: CPT | Performed by: INTERNAL MEDICINE

## 2022-11-07 RX ORDER — CIPROFLOXACIN 500 MG/1
TABLET, FILM COATED ORAL
COMMUNITY
Start: 2022-10-27

## 2022-11-07 RX ORDER — TAMSULOSIN HYDROCHLORIDE 0.4 MG/1
CAPSULE ORAL
COMMUNITY
Start: 2022-10-24

## 2022-11-07 SDOH — ECONOMIC STABILITY: FOOD INSECURITY: WITHIN THE PAST 12 MONTHS, YOU WORRIED THAT YOUR FOOD WOULD RUN OUT BEFORE YOU GOT MONEY TO BUY MORE.: NEVER TRUE

## 2022-11-07 SDOH — ECONOMIC STABILITY: FOOD INSECURITY: WITHIN THE PAST 12 MONTHS, THE FOOD YOU BOUGHT JUST DIDN'T LAST AND YOU DIDN'T HAVE MONEY TO GET MORE.: NEVER TRUE

## 2022-11-07 ASSESSMENT — PATIENT HEALTH QUESTIONNAIRE - PHQ9
SUM OF ALL RESPONSES TO PHQ9 QUESTIONS 1 & 2: 0
1. LITTLE INTEREST OR PLEASURE IN DOING THINGS: 0
SUM OF ALL RESPONSES TO PHQ QUESTIONS 1-9: 0
2. FEELING DOWN, DEPRESSED OR HOPELESS: 0

## 2022-11-07 ASSESSMENT — SOCIAL DETERMINANTS OF HEALTH (SDOH): HOW HARD IS IT FOR YOU TO PAY FOR THE VERY BASICS LIKE FOOD, HOUSING, MEDICAL CARE, AND HEATING?: NOT HARD AT ALL

## 2022-11-07 NOTE — PROGRESS NOTES
Chief Complaint   Patient presents with    Follow-up       Assessment/Plan:  Tariq was seen today for follow-up. Diagnoses and all orders for this visit:    Routine adult health maintenance  -     Lipid Panel; Future  -     CBC with Auto Differential; Future  -     Comprehensive Metabolic Panel; Future  -     T4, Free; Future  -     TSH; Future    Prostate cancer screening  -     Cancel: PSA Screening; Future    Need for hepatitis C screening test  -     Hepatitis C Antibody; Future    Screening for human immunodeficiency virus  -     HIV Screen; Future    Rectus diastasis  -     [facility]    Benign prostatic hyperplasia with urinary retention    Colon cancer screening  -     COLONOSCOPY (Screening); Future  -     AFL - Ade Brewer MD, Gastroenterology, [facility]        Vitals:    11/07/22 0746   BP: 126/80   Pulse: 95   Temp: 97.8 °F (36.6 °C)   SpO2: 96%       Physical Exam  Vitals reviewed. Constitutional:       General: He is not in acute distress. Appearance: Normal appearance. He is well-developed. He is not diaphoretic. HENT:      Head: Normocephalic and atraumatic. Right Ear: External ear normal.      Left Ear: External ear normal.      Nose: No nasal deformity or rhinorrhea. Mouth/Throat:      Mouth: No lacerations or oral lesions. Dentition: Does not have dentures. Pharynx: No oropharyngeal exudate or uvula swelling. Neck:      Thyroid: No thyroid mass or thyromegaly. Vascular: Normal carotid pulses. No carotid bruit, hepatojugular reflux or JVD. Trachea: Trachea normal. No tracheal tenderness or tracheal deviation. Cardiovascular:      Rate and Rhythm: Normal rate and regular rhythm. Chest Wall: PMI is not displaced. Pulses: Normal pulses. Heart sounds: Normal heart sounds, S1 normal and S2 normal. Heart sounds not distant. No murmur heard. No systolic murmur is present.    No diastolic murmur is present. No friction rub. No gallop. No S3 or S4 sounds. Pulmonary:      Effort: Pulmonary effort is normal. No respiratory distress. Breath sounds: Normal breath sounds. No stridor. No wheezing, rhonchi or rales. Chest:      Chest wall: No tenderness. Abdominal:      General: Bowel sounds are normal. There is no distension or abdominal bruit. Palpations: Abdomen is soft. There is no shifting dullness or mass. Tenderness: There is no abdominal tenderness. There is no right CVA tenderness, left CVA tenderness, guarding or rebound. Hernia: No hernia is present. Musculoskeletal:      Cervical back: Full passive range of motion without pain, normal range of motion and neck supple. No edema or rigidity. Lymphadenopathy:      Cervical: No cervical adenopathy. Skin:     General: Skin is warm and dry. Coloration: Skin is not pale. Findings: No rash. Neurological:      Mental Status: He is alert and oriented to person, place, and time. Cranial Nerves: No cranial nerve deficit. Psychiatric:         Mood and Affect: Mood is not anxious. Speech: Speech normal.         Behavior: Behavior normal. Behavior is not agitated. Thought Content:  Thought content normal.         Judgment: Judgment normal.       PHQ Scores 11/7/2022 2/7/2020   PHQ2 Score 0 0   PHQ9 Score 0 0     Interpretation of Total Score Depression Severity: 1-4 = Minimal depression, 5-9 = Mild depression, 10-14 = Moderate depression, 15-19 = Moderately severe depression, 20-27 = Severe depression    Chrissy Bear MD  [address]

## 2022-11-16 ENCOUNTER — HOSPITAL ENCOUNTER (OUTPATIENT)
Dept: PHYSICAL THERAPY | Age: 52
Setting detail: THERAPIES SERIES
Discharge: HOME | End: 2022-11-16
Payer: COMMERCIAL

## 2022-11-16 PROCEDURE — 97110 THERAPEUTIC EXERCISES: CPT

## 2022-11-16 PROCEDURE — 97161 PT EVAL LOW COMPLEX 20 MIN: CPT

## 2022-11-16 NOTE — FLOWSHEET NOTE
[address] [facility]  Phone: [phone]   Fax: [phone]    Physical Therapy Daily Treatment Note    Date:  2022     Patient Name:  García Greene    :  1970  MRN: [id number]  Medical Diagnosis Information:  Rectus diastasis [M62.08]        PT diagnosis: rectus diastasis W2 (3-5cm); R vestibular hypofunction                                         Insurance information: PT Insurance Information: BCBS  Physician Information:  Manju Garzon MD  Plan of care signed (Y/N): []  Yes [x]  No     Date of Patient follow up with Physician:      Progress Report: []  Yes  [x]  No     Date Range for reporting period:  Beginnin2022  Ending: TBD    Progress report due (10 Rx/or 30 days whichever is less): visit #2    Recertification due (POC duration/ or 90 days whichever is less): visit #2    Visit # Insurance Allowable Auth required?  Date Range   1/3 PMN []  Yes  [x]  No      Latex Allergy:  [x]NO      []YES  Preferred Language for Healthcare:   [x]English       []other:    Functional Scale:           Date assessed:  FOTO physical FS primary measure score = DNC at eval  22    Pain level:  0/10     SUBJECTIVE:  See eval    OBJECTIVE: See eval    RESTRICTIONS/PRECAUTIONS: NA    Exercises/Interventions:     Therapeutic Exercises (34664) Resistance / level Sets/sec Reps Notes          Hip thruster bridges   demonstrated    Anti-rotation press Blue  5 x 5'' R/L    Anti-rotation press stir the pot blue  6x R/L    Standing alt hip abd  blue  demonstrated    Qped glute kicks   demonstrated                         Therapeutic Activities (17096)                                   Gait (47357)                                   Neuromuscular Re-ed (86779)       VOR - 2ft/6ft - [place] week 1   discussed                                       Manual Intervention (70603)                                                   Modalities:     Pt. Education:  11/16/2022  -patient educated on diagnosis, prognosis and expectations for rehab  Patient educated on vestibular hypofunction and evidence behind treatment  Patient educated on abdominal binder and that this can be a passive support, but does not provide active/dynamic control/support  -all patient questions were answered    Home Exercise Program:  Purple and blue bands provided    [place] handout week 1 (VOR 2ft/6ft)  &  Access Code: [email or web address]  URL: [email or web address]/  Date: 11/16/2022  Prepared by: Supriya [place]     Exercises  Squatting Anti-Rotation Press - 1 x daily - 4 x weekly - 1 sets - 10 reps - 10 hold  Half Kneeling Anti-Rotation Press - Forward Leg [place] Side - 1 x daily - 4 x weekly - 1 sets - 10 reps - 10 hold  Hip Abduction with Resistance Loop - 1 x daily - 4 x weekly - 3 sets - 10-15 reps  Quadruped Hip Extension Kicks - 1 x daily - 4 x weekly - 2 sets - 10 reps - 3 hold  Side Plank on Elbow - 1 x daily - 4 x weekly - 1 sets - 5 reps - 10-30 hold  Full Plank - 1 x daily - 4 x weekly - 1 sets - 10 reps - 10-30 hold     Therapeutic Exercise and NMR EXR  [x] (25717) Provided verbal/tactile cueing for activities related to strengthening, flexibility, endurance, ROM for improvements in  [x] LE / Lumbar: LE, proximal hip, and core control with self care, mobility, lifting, ambulation. [x] UE / Cervical: cervical, postural, scapular, scapulothoracic and UE control with self care, reaching, carrying, lifting, house/yardwork, driving, computer work. [x] (30297) Provided verbal/tactile cueing for activities related to improving balance, coordination, kinesthetic sense, posture, motor skill, proprioception to assist with   [] LE / lumbar: LE, proximal hip, and core control in self care, mobility, lifting, ambulation and eccentric single leg control.    [x] UE / cervical: cervical, scapular, scapulothoracic and UE control with self care, reaching, carrying, lifting, house/yardwork, driving, computer work.   [] (78850) Therapist is in constant attendance of 2 or more patients providing skilled therapy interventions, but not providing any significant amount of measurable one-on-one time to either patient, for improvements in  [] LE / lumbar: LE, proximal hip, and core control in self care, mobility, lifting, ambulation and eccentric single leg control. [] UE / cervical: cervical, scapular, scapulothoracic and UE control with self care, reaching, carrying, lifting, house/yardwork, driving, computer work. NMR and Therapeutic Activities:    [] (18598 or 90189) Provided verbal/tactile cueing for activities related to improving balance, coordination, kinesthetic sense, posture, motor skill, proprioception and motor activation to allow for proper function of   [] LE: / Lumbar core, proximal hip and LE with self care and ADLs  [] UE / Cervical: cervical, postural, scapular, scapulothoracic and UE control with self care, carrying, lifting, driving, computer work.   [] (20013) Gait Re-education- Provided training and instruction to the patient for proper LE, core and proximal hip recruitment and positioning and eccentric body weight control with ambulation re-education including up and down stairs     Home Management Training / Self Care:  [] (05193) Provided self-care/home management training related to activities of daily living and compensatory training, and/or use of adaptive equipment for improvement with: ADLs and compensatory training, meal preparation, safety procedures and instruction in use of adaptive equipment, including bathing, grooming, dressing, personal hygiene, basic household cleaning and chores.      Home Exercise Program:    [x] (32480) Reviewed/Progressed HEP activities related to strengthening, flexibility, endurance, ROM of   [x] LE / Lumbar: core, proximal hip and LE for functional self-care, mobility, lifting and ambulation/stair navigation   [x] UE / Cervical: cervical, postural, scapular, scapulothoracic and UE control with self care, reaching, carrying, lifting, house/yardwork, driving, computer work  [x] (63901)Reviewed/Progressed HEP activities related to improving balance, coordination, kinesthetic sense, posture, motor skill, proprioception of   [x] LE: core, proximal hip and LE for self care, mobility, lifting, and ambulation/stair navigation    [x] UE / Cervical: cervical, postural,  scapular, scapulothoracic and UE control with self care, reaching, carrying, lifting, house/yardwork, driving, computer work    Manual Treatments:  PROM / STM / Oscillations-Mobs:  G-I, II, III, IV (PA's, Inf., Post.)  [] ([id number]) Provided manual therapy to mobilize LE, proximal hip and/or LS spine soft tissue/joints for the purpose of modulating pain, promoting relaxation,  increasing ROM, reducing/eliminating soft tissue swelling/inflammation/restriction, improving soft tissue extensibility and allowing for proper ROM for normal function with   [] LE / lumbar: self care, mobility, lifting and ambulation. [] UE / Cervical: self care, reaching, carrying, lifting, house/yardwork, driving, computer work. Modalities:  [] (80920) Vasopneumatic compression: Utilized vasopneumatic compression to decrease edema / swelling for the purpose of improving mobility and quad tone / recruitment which will allow for increased overall function including but not limited to self-care, transfers, ambulation, and ascending / descending stairs.        Charges:  Timed Code Treatment Minutes: 10   Total Treatment Minutes: 45     [x] EVAL - LOW (46763)   [] EVAL - MOD (83227)  [] EVAL - HIGH (45423)  [] RE-EVAL (23737)  [x] UT(73722) x 1      [] Ionto  [] NMR (53382) x       [] Vaso  [] Manual (69760) x       [] Ultrasound  [] TA x        [] Mech Traction (90323)  [] Aquatic Therapy x     [] ES (un) (44649):   [] Home Management Training x  [] ES(attended) (83069)   [] Dry Needling 1-2 muscles (45235):  [] Dry Needling 3+ muscles (680215)  [] Group:      [] Other:     GOALS:  Patient stated goal: to abolish dizziness; to improve/reduce rectus diastasis as much as possible  [] Progressing: [] Met: [] Not Met: [] Adjusted    Therapist goals for Patient:   Short Term Goals: To be achieved in: 2 weeks  1. Independent in HEP and progressions per patient tolerance, in order to promote vestibular habituation and promote core strength/stabilization. (Performance 3 days weekly)  [] Progressing: [] Met: [] Not Met: [] Adjusted    Long Term Goals: To be achieved in: 3 weeks  1. Patient will demonstrate negative oculomotor/vestibular function testing to indicate symmetry in function of vestibular system. [] Progressing: [] Met: [] Not Met: [] Adjusted  2. Patient will demonstrate an increase in Strength to 10/10 tvAbd; 5/5 hip ext/abd bilaterally to allow for proper functional mobility as indicated by patients Functional Deficits. [] Progressing: [] Met: [] Not Met: [] Adjusted  3. Patient will return to functional activities including workouts (2 days per week) without increased symptoms or restrictions due to dizziness or rectus diastasis. [] Progressing: [] Met: [] Not Met: [] Adjusted    Overall Progression Towards Functional goals/ Treatment Progress Update:  [] Patient is progressing as expected towards functional goals listed. [] Progression is slowed due to complexities/Impairments listed. [] Progression has been slowed due to co-morbidities.   [x] Plan just implemented, too soon to assess goals progression <30days   [] Goals require adjustment due to lack of progress  [] Patient is not progressing as expected and requires additional follow up with physician  [] Other    Persisting Functional Limitations/Impairments:  []Sleeping []Sitting               []Standing []Transfers        []Walking []Kneeling               []Stairs []Squatting / bending   []ADLs []Reaching  [x]Lifting  []Housework  []Driving []Job related tasks  [x]Sports/Recreation []Other:      ASSESSMENT:  See eval  Treatment/Activity Tolerance:  [x] Patient able to complete tx [] Patient limited by fatigue  [] Patient limited by pain  [] Patient limited by other medical complications  [] Other:     Prognosis: [] Good [] Fair  [] Poor    Patient Requires Follow-up: [x] Yes  [] No    Plan for next treatment session: follow up/progress vestibular activities (consider week 2) for R hypofunction; progress core/back/abdominal strengthening as able without exacerbation/pronunciation of rectus diastasis    PLAN: See eval. PT 1x / 2-3 weeks for up to 3 visits  [] Continue per plan of care [] Alter current plan (see comments)  [x] Plan of care initiated [] Hold pending MD visit [] Discharge    Electronically signed by: Elaina Marinelli PT PT, DPT,OMT-C    Note: If patient does not return for scheduled/ recommended follow up visits, this note will serve as a discharge from care along with most recent update on progress.

## 2022-11-16 NOTE — PLAN OF CARE
[address] [address], [address]  Phone: [phone]   Fax: [phone]     Physical Therapy Certification    Dear MD Diane Hernandez MD,    We had the pleasure of evaluating the following patient for physical therapy services at [address] [place] [address]. A summary of our findings can be found in the initial assessment below. This includes our plan of care. If you have any questions or concerns regarding these findings, please do not hesitate to contact me at the office phone number checked above. Thank you for the referral.       Physician Signature:_______________________________Date:__________________  By signing above (or electronic signature), therapists plan is approved by physician      Patient: Margo Vasquez   : 1970   MRN: [id number]  Referring Physician: MD Diane Hernandez MD      Evaluation Date: 2022      Medical Diagnosis Information:  Rectus diastasis [M62.08]   PT diagnosis: rectus diastasis W2 (3-5cm); R vestibular hypofunction                                         Insurance information: PT Insurance Information: BCBS     Precautions/ Contra-indications: NA  Latex Allergy:  [x]NO      []YES  Preferred Language for Healthcare:   [x]English       []Other:    C-SSRS Triggered by Intake questionnaire (Past 2 wk assessment ):   [x] No, Questionnaire did not trigger screening.   [] Yes, Patient intake triggered C-SSRS Screening     [] Completed, no further action required. [] Completed, PCP notified via Epic    SUBJECTIVE: Patient stated complaint: the patient has been working with , Joaquim Ambrose, at the [facility] for 2 years, and has been involved with yoga through that time. He started to notice in certain yoga poses (cobra, down-dog) that he was developing/showing signs of diastasis recti, and reports that while it isn't painful - it is concerning.  He was screened by PT Irma Calzada and saw MD for prescription - and presents today to obtain exercises that he can perform to improve core stability without exacerbation of diastasis. The patient also comments he has been having dizziness, which appears to be pronounced at the end of his training sessions when he is tired. Relevant Medical History: BPH  Functional Outcome: FOTO: DNC this date    Pain Scale: 0/10  Easing factors: rest; avoiding positions that pronounce diastasis  Provocative factors: dead-bugs, sit-up motions     Type: []Constant   [x]Intermittent  []Radiating []Localized []other:     Numbness/Tingling: NA    Occupation/School: physician    Living Status/Prior Level of Function: Prior to this injury / incident, pt was independent with ADLs and IADLs, and remains independent.     OBJECTIVE:   Palpation: very mild TTP in weakest areas/borders of diastasis    Functional Mobility/Transfers: able to perform all transfers independently    Posture: unremarkable    Inspection: diastasis measures: (surface) 4cm W x 12cm L    Gait: (include devices/WB status) unremakrable    Bandages/Dressings/Incisions: NA    ROM  Comments   Lumbar Flex full Hamstring tightness reported   Lumbar Ext full      ROM LEFT RIGHT Comments   Lumbar Side Bend full full    Lumbar Rotation      Hip Flexion full full    Hip Abd      Hip ER      Hip IR      Hip Extension [facility]    Knee Ext      Knee Flex      Hamstring Flex      Piriformis                    Joint mobility: not assessed   []Normal    []Hypo   []Hyper    Strength / Myotomes LEFT RIGHT Comments   Multifidus      Transverse Ab   See below   Hip Flexors (L1-2) 5 5    Hip Abductors 4 5    Hip Extensors 5 4    Hip Internal Rotators      Hip External Rotators      Quads (L2-4)      Hamstrings       Ankle Plantarflexion (S1-2)      Ankle Dorsiflexion (L4-5)      Ankle Inversion      Ankle Eversion (S1-2)      Great Toe Extension (L5)        Abi Ratios:  1:1 for flexors/extensors/sideplank/plank    TA Muscle Contraction Scale - patient able to brace easily and understands tvAbd activation    Criteria                                               Score  Quality of Contraction   Not Present      [] 0   Rapid, Superificial     [] 1   Just Perceptible     [] 2     Gentle, Slow      [x] 3    Substitution   Resting       [] 0   Moderate to Strong     [] 1    Subtle Perceptible     [] 2   None       [x] 3    Symmetry of Contraction   Unilateral       [] 0   Bilateral/Asymmetrical     [] 1   Symmetrical       [x] 2    Breathing     Inability/Difficulty Breathing during contraction [x] 0   Able to hold contraction while Breathing  [] 1    Holding   Able to Hold Contraction <10 s   [x] 0   Able to Hold Contraction >10 s   [] 1      _8_/10  Adapted from [employer]ryan, Copyright 2009      Neural dynamic tension testing Normal Abnormal Comments   Slump Test  - Degree of knee flexion:       SLR       0-30      30-70      Femoral nerve (L2-4)          Orthopedic Special Tests:    Normal Abnormal N/A Comments   Toe walk         Heel Walk       Fwd Bend-aberrant or innominate mvmt)       Standing Flexion test       Trendelenburg       Kemps/Quadrant       Stork       LUZ/Duran       Hip scour       SLR       Crossed SLR       Supine to sit       Hip thrust       SI distraction/compression       PA/Spring       Prone Instability test       Prone knee bend       Sacral Spring/thrust                Oculomotor/Vestibular Examination     Spontaneous nystagmus:  [] Left  [] Right [x] Absent    Gaze-Evoked nystagmus with fixation present:   Primary [] Present [x] Absent   Right  [] Present [x] Absent   Left  [] Present [x] Absent    Smooth Pursuit (H):  [] Normal [x] Abnormal Comments: min diff to R; no fixation blocked    Smooth Pursuit (V):  [x] Normal [] Abnormal Comments:     Saccades (H):  [] Normal [x] Abnormal Comments: min diff to R; no fixation blocked    Saccades (V):   [x] Normal [] Abnormal Comments:     Convergence:  [x] Normal [] Abnormal Comments:     Head Thrust Test:  [] Normal [x] Abnormal  Comments: R abnormal    VOR Cancellation (H): [] Normal [] Abnormal Comments:    VOR Cancellation (V): [] Normal [] Abnormal Comments:     VOR (V):   [x] Normal [] Abnormal Comments:    VOR (H):   [] Normal [x] Abnormal Comments: R difficulty    Positional Testing:  R Hallpike-[place] maneuver:    Nystagmus:  [] Yes  [x] No  [] Duration:       [] Direction:    Vertigo:  [] Yes  [x] No  [] Duration:     L Hallpike-[place] maneuver:   Nystagmus:  [] Yes  [x] No  [] Duration:       [] Direction:    Vertigo:  [] Yes  [x] No  [] Duration:       [x] Patient history, allergies, meds reviewed. Medical chart reviewed. See intake form. Review Of Systems (ROS):  [x]Performed Review of systems (Integumentary, CardioPulmonary, Neurological) by intake and observation. Intake form has been scanned into medical record. Patient has been instructed to contact their primary care physician regarding ROS issues if not already being addressed at this time.       Co-morbidities/Complexities (which will affect course of rehabilitation):   []None        []Hx of COVID   Arthritic conditions   []Rheumatoid arthritis (M05.9)  []Osteoarthritis (M19.91)  []Gout   Cardiovascular conditions   []Hypertension (I10)  []Hyperlipidemia (E78.5)  []Angina pectoris (I20)  []Atherosclerosis (I70)  []Pacemaker  []Hx of CABG/stent/  cardiac surgeries   Musculoskeletal conditions   []Disc pathology   []Congenital spine pathologies   []Osteoporosis (M81.8)  []Osteopenia (M85.8)  []Scoliosis       Endocrine conditions   []Hypothyroid (E03.9)  []Hyperthyroid Gastrointestinal conditions   []Constipation (H40.67)   Metabolic conditions   []Morbid obesity (E66.01)  []Diabetes type 1(E10.65) or 2 (E11.65)   []Neuropathy (G60.9)     Cardio/Pulmonary conditions   []Asthma (J45)  []Coughing   []COPD (J44.9)  []CHF  []A-fib   Psychological Disorders  []Anxiety (F41.9)  []Depression (F32.9)   []Other:   Developmental Disorders  []Autism (F84.0)  []CP (G80)  []Down Syndrome (Q90.9)  []Developmental delay     Neurological conditions  []Prior Stroke (I69.30)  []Parkinson's (G20)  []Encephalopathy (G93.40)  []MS (G35)  []Post-polio (G14)  []SCI  []TBI  []ALS Other conditions  []Fibromyalgia (M79.7)  []Vertigo  []Syncope  []Kidney Failure  []Cancer      []currently undergoing                treatment  []Pregnancy  []Incontinence   Prior surgeries  []involved limb  []previous spinal surgery  [] section birth  []hysterectomy  []bowel / bladder surgery  []other relevant surgeries   [x]Other: BPH             Barriers to/and or personal factors that will affect rehab potential:              []Age  []Sex    []Smoker              [x]Motivation/Lack of Motivation                        []Co-Morbidities              []Cognitive Function, education/learning barriers              []Environmental, home barriers              []profession/work barriers  [x]past PT/medical experience  []other:  Justification: will facilitate potential and ability to follow through with HEP/PT POC    Falls Risk Assessment (30 days):   [x] Falls Risk assessed and no intervention required. [] Falls Risk assessed and Patient requires intervention due to being higher risk   TUG score (>12s at risk):     [] Falls education provided, including         ASSESSMENT: The patient is a 45year old male who presents with signs and symptoms consistent with rectus diastasis and R vestibular hypofunction. The patient will benefit from POC to address core stabilization, flexibility, hip/abdominal/back strength and endurance, as well as reduce vestibular hypofunction through habituation activities. The patient will perform HEP for 3 weeks and follow up with PT again at that time, and may continue with an additional visit or two beyond that time.     Functional Impairments:     []Noted lumbar/proximal hip hypomobility   []Noted lumbosacral and/or generalized hypermobility   []Decreased Lumbosacral/hip/LE functional ROM   [x]Decreased core/proximal hip strength and neuromuscular control    [x]Decreased LE functional strength    []Abnormal reflexes/sensation/myotomal/dermatomal deficits  []Reduced balance/proprioceptive control    [x]other: R vestibular hypofunction      Functional Activity Limitations (from functional questionnaire and intake)   []Reduced ability to tolerate prolonged functional positions   []Reduced ability or difficulty with changes of positions or transfers between positions   []Reduced ability to maintain good posture and demonstrate good body mechanics with sitting, bending, and lifting   []Reduced ability to sleep   [] Reduced ability or tolerance with driving and/or computer work   [x]Reduced ability to perform lifting, reaching, carrying tasks   []Reduced ability to squat   []Reduced ability to forward bend   []Reduced ability to ambulate prolonged functional periods/distances/surfaces   []Reduced ability to ascend/descend stairs   [x]other: sit/curl up       Participation Restrictions   []Reduced participation in self care activities   []Reduced participation in home management activities   []Reduced participation in work activities   []Reduced participation in social activities. [x]Reduced participation in sport/recreational activities. Classification:   []Signs/symptoms consistent with Lumbar instability/stabilization subgroup. []Signs/symptoms consistent with Lumbar mobilization/manipulation subgroup, myotomes and dermatomes intact. Meets manipulation criteria.     []Signs/symptoms consistent with Lumbar direction specific/centralization subgroup   []Signs/symptoms consistent with Lumbar traction subgroup     []Signs/symptoms consistent with lumbar facet dysfunction   []Signs/symptoms consistent with lumbar stenosis type dysfunction   []Signs/symptoms consistent with nerve root involvement including myotome & dermatome dysfunction   []Signs/symptoms consistent with post-surgical status including: decreased ROM, strength and function. []signs/symptoms consistent with pathology which may benefit from Dry needling     [x]other: R vestibular hypofunction; rectus diastasis (umbilical/epigastric) W2 (width 3-5cm)    Prognosis/Rehab Potential:      [x]Excellent   []Good    []Fair   []Poor    Tolerance of evaluation/treatment:    [x]Excellent   []Good    []Fair   []Poor     Physical Therapy Evaluation Complexity Justification  [x] A history of present problem with:  [] no personal factors and/or comorbidities that impact the plan of care;  [x]1-2 personal factors and/or comorbidities that impact the plan of care  []3 personal factors and/or comorbidities that impact the plan of care  [x] An examination of body systems using standardized tests and measures addressing any of the following: body structures and functions (impairments), activity limitations, and/or participation restrictions;:  [] a total of 1-2 or more elements   [x] a total of 3 or more elements   [] a total of 4 or more elements   [x] A clinical presentation with:  [x] stable and/or uncomplicated characteristics   [] evolving clinical presentation with changing characteristics  [] unstable and unpredictable characteristics;   [x] Clinical decision making of [x] Low, [] moderate, [] high complexity using standardized patient assessment instrument and/or measurable assessment of functional outcome.     [x] EVAL (LOW) 04090 (typically 15 minutes face-to-face)  [] EVAL (MOD) 58766 (typically 30 minutes face-to-face)  [] EVAL (HIGH) 72857 (typically 45 minutes face-to-face)  [] RE-EVAL     PLAN:    Frequency/Duration:  1 visit within 2-3 Weeks; up to 3 visits  Interventions:  [x]  Therapeutic exercise including: strength training, ROM, for LE, Glutes and core   [x]  NMR activation and proprioception for glutes , LE and Core   [x] Manual therapy as indicated for Hip complex, LE and spine to include: Dry Needling/IASTM, STM, PROM, Gr I-IV mobilizations, manipulation. [x]  Modalities as needed that may include: thermal agents, E-stim, Biofeedback, US, iontophoresis as indicated  [x]  Patient education on joint protection, postural re-education, activity modification, progression of HEP. HEP instruction: [place] handout week 1 (VOR 2ft/6ft)  &  Access Code: [email or web address]  URL: [employer]/  Date: 11/16/2022  Prepared by: Duncan Vela    Exercises  Squatting Anti-Rotation Press - 1 x daily - 4 x weekly - 1 sets - 10 reps - 10 hold  Half Kneeling Anti-Rotation Press - Forward Leg Seal [place]ove Side - 1 x daily - 4 x weekly - 1 sets - 10 reps - 10 hold  Hip Abduction with Resistance Loop - 1 x daily - 4 x weekly - 3 sets - 10-15 reps  Quadruped Hip Extension Kicks - 1 x daily - 4 x weekly - 2 sets - 10 reps - 3 hold  Side Plank on Elbow - 1 x daily - 4 x weekly - 1 sets - 5 reps - 10-30 hold  Full Plank - 1 x daily - 4 x weekly - 1 sets - 10 reps - 10-30 hold    GOALS:  Patient stated goal: to abolish dizziness; to improve/reduce rectus diastasis as much as possible  [] Progressing: [] Met: [] Not Met: [] Adjusted    Therapist goals for Patient:   Short Term Goals: To be achieved in: 2 weeks  1. Independent in HEP and progressions per patient tolerance, in order to promote vestibular habituation and promote core strength/stabilization. (Performance 3 days weekly)  [] Progressing: [] Met: [] Not Met: [] Adjusted    Long Term Goals: To be achieved in: 3 weeks  1. Patient will demonstrate negative oculomotor/vestibular function testing to indicate symmetry in function of vestibular system. [] Progressing: [] Met: [] Not Met: [] Adjusted  2. Patient will demonstrate an increase in Strength to 10/10 tvAbd; 5/5 hip ext/abd bilaterally to allow for proper functional mobility as indicated by patients Functional Deficits.    [] Progressing: [] Met: [] Not Met: [] Adjusted  3. Patient will return to functional activities including workouts (2 days per week) without increased symptoms or restrictions due to dizziness or rectus diastasis.   [] Progressing: [] Met: [] Not Met: [] Adjusted    Electronically signed by:  Letty Mancuso, PT, DPT, OMT-C

## 2023-02-22 ENCOUNTER — OFFICE VISIT (OUTPATIENT)
Dept: PULMONOLOGY | Age: 53
End: 2023-02-22
Payer: COMMERCIAL

## 2023-02-22 VITALS
BODY MASS INDEX: 28 KG/M2 | WEIGHT: 158 LBS | SYSTOLIC BLOOD PRESSURE: 112 MMHG | DIASTOLIC BLOOD PRESSURE: 68 MMHG | HEART RATE: 87 BPM | HEIGHT: 63 IN | OXYGEN SATURATION: 98 %

## 2023-02-22 DIAGNOSIS — G47.33 OSA (OBSTRUCTIVE SLEEP APNEA): Primary | ICD-10-CM

## 2023-02-22 PROCEDURE — 99213 OFFICE O/P EST LOW 20 MIN: CPT | Performed by: INTERNAL MEDICINE

## 2023-02-22 ASSESSMENT — SLEEP AND FATIGUE QUESTIONNAIRES
HOW LIKELY ARE YOU TO NOD OFF OR FALL ASLEEP WHILE LYING DOWN TO REST IN THE AFTERNOON WHEN CIRCUMSTANCES PERMIT: 0
HOW LIKELY ARE YOU TO NOD OFF OR FALL ASLEEP WHILE SITTING QUIETLY AFTER LUNCH WITHOUT ALCOHOL: 0
HOW LIKELY ARE YOU TO NOD OFF OR FALL ASLEEP WHILE WATCHING TV: 0
HOW LIKELY ARE YOU TO NOD OFF OR FALL ASLEEP WHEN YOU ARE A PASSENGER IN A CAR FOR AN HOUR WITHOUT A BREAK: 0
HOW LIKELY ARE YOU TO NOD OFF OR FALL ASLEEP IN A CAR, WHILE STOPPED FOR A FEW MINUTES IN TRAFFIC: 0
HOW LIKELY ARE YOU TO NOD OFF OR FALL ASLEEP WHILE SITTING AND READING: 0
ESS TOTAL SCORE: 0
HOW LIKELY ARE YOU TO NOD OFF OR FALL ASLEEP WHILE SITTING AND TALKING TO SOMEONE: 0
HOW LIKELY ARE YOU TO NOD OFF OR FALL ASLEEP WHILE SITTING INACTIVE IN A PUBLIC PLACE: 0

## 2023-02-22 NOTE — PROGRESS NOTES
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 Reports that he has been using his auto CPAP therapy regularly and feels benefited from it. Denies any daytime symptoms. Sleeping more consistently. No mask leakage or pressure intolerance. He did buy a smart watch which he used to measure his oxygen saturation at nighttime and salt of few oxygen desaturation events. Weight has been stable. 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.  Reports that for the last several years he has been having poor quality sleep with snoring, gasping, choking at nighttime. Sleep remains disturbed and he wakes up tired. Feels sleepy throughout the day. As per him, he has gained about 10 pounds over the last few years. Sleep Medicine 2/22/2023 8/18/2022 11/17/2021   Sitting and reading 0 0 1   Watching TV 0 0 1   Sitting, inactive in a public place (e.g. a theatre or a meeting) 0 0 1   As a passenger in a car for an hour without a break 0 1 1   Lying down to rest in the afternoon when circumstances permit 0 0 2   Sitting and talking to someone 0 0 1   Sitting quietly after a lunch without alcohol 0 0 2   In a car, while stopped for a few minutes in traffic 0 0 1   [place] Sleepiness Score 0 1 10       STOP-BANG Questionnaire    Snore Loudly Yes   Often feel Tired/Fatigued/Sleepy Yes   Observed breathing pauses No   Blood pressure problems No   BMI >35 Kg/m2 Body mass index is 27.99 kg/m². Age>50 45 y.o. Neck Circumference>16 inches      Gender Male? male     Total 4       REVIEW OF SYSTEMS:   8 point review of system is negative except that mentioned in the HPI.     PAST MEDICAL HISTORY:   Past Medical History:   Diagnosis Date    BPH (benign prostatic hyperplasia)        PAST SURGICAL HISTORY: No past surgical history on file. SOCIAL HISTORY:   Social History     Tobacco Use    Smoking status: Never    Smokeless tobacco: Never   Vaping Use    Vaping Use: Never used       FAMILY HISTORY:   Family History   Problem Relation Age of Onset    Diabetes Mother     Other Mother     High Blood Pressure Mother     Memory Loss Mother     Diabetes type 2  Mother     Coronary Art Dis Father     Diabetes Father     High Blood Pressure Father     Stroke Father     Osteoarthritis Other        MEDICATIONS:     Current Outpatient Medications on File Prior to Visit   Medication Sig Dispense Refill    tamsulosin (FLOMAX) 0.4 MG capsule        No current facility-administered medications on file prior to visit. ALLERGIES:   Allergies as of 02/22/2023 - Fully Reviewed 02/22/2023   Allergen Reaction Noted    Bactrim [sulfamethoxazole-trimethoprim] Rash 11/07/2022      OBJECTIVE:   height is 5' 3\" (1.6 m) and weight is 158 lb (71.7 kg). His blood pressure is 112/68 and his pulse is 87. His oxygen saturation is 98%. PHYSICAL EXAM:    CONSTITUTIONAL: He is a 45y.o.-year-old who appears his stated age. He is alert and oriented x 3 and in no acute distress. HEENT: PERRLA, EOMI. No scleral icterus. No thrush, atraumatic, normocephalic. Mallampati class 1 airway. NECK: Supple, without cervical or supraclavicular lymphadenopathy:  CARDIOVASCULAR: S1 S2 RRR. Without murmer  RESPIRATORY & CHEST: Lungs are clear to auscultation and percussion. No wheezing, no crackles. Good air movement  GASTROINTESTINAL & ABDOMEN: Soft, nontender, positive bowel sounds in all quadrants, non-distended, without hepatosplenomegaly. GENITOURINARY: Deferred. MUSCULOSKELETAL: No tenderness to palpation of the axial skeleton. There is no clubbing. No cyanosis. No edema of the lower extremities. SKIN OF BODY: No rash or jaundice. PSYCHIATRIC EVALUATION: Normal affect.  Patient answers questions appropriately. HEMATOLOGIC/LYMPHATIC/ IMMUNOLOGIC: No palpable lymphadenopathy. NEUROLOGIC: Alert and oriented x 3. Groslly non-focal. Motor strength is 5+/5 in all muscle groups. The patient has a normal sensorium globally. LABS:    Lab Summary Latest Ref Rng & Units 8/17/2022 7/1/2022 6/4/2022   WBC 4.0 - 11.0 K/uL 6.8 - -   Hgb 13.5 - 17.5 g/dL 13.6 - -   Hct 40.5 - 52.5 % 40. 3(L) - -   Platelets 684 - 250 K/uL 213 - -   Sodium 136 - 145 mmol/L - - 132(L)   Potassium 3.5 - 5.1 mmol/L - - 3.5   BUN 7 - 20 mg/dL 15 - 10   Creatinine 0.9 - 1.3 mg/dL 0.7(L) - 0.8(L)   Glucose 70 - 99 mg/dL - 89 91   Calcium 8.3 - 10.6 mg/dL - - 9.0   Magnesium 1.80 - 2.40 mg/dL - - 2.00   HDL cholesterol 40 - 60 mg/dL - 29(L) -   Triglycerides 0 - 150 mg/dL - 141 -   LDL calc <100 mg/dL - 68 -   TSH 0.27 - 4.20 uIU/mL 1.33 - -   Some recent data might be hidden       All labs were personally reviewed by me any my interpretation is: CBC is normal. Chem 8 is normal.    IMAGING:    No new chest imaging for me to review.       Pulmonary Functions Testing Results:    Home sleep testing done on 11/23/2021  Total MEERA: 31.6  Lowest oxygen saturation: 72%  Time below an oxygen saturation of 88%: 17.2 minutes    CPAP titration done on 12/6/2021  Adequate therapy: Auto CPAP 9 to 14 cm H2O with small dream Wear mask    CPAP compliance summary     8/19/2022 -2/14/2023 6/7/2022-8/10/2022   Days with device usage 177/180 days 64/65 days   Average Usage 7 hours 40 minutes 6 hours 40 minutes   Days with device usage >4hrs 98% 98%   Large Leak per night 6.8 L/min 6.2 L minute   AHI 1.3 2.5   CPAP settings Auto CPAP 9 to 14 cm H2O Auto CPAP 9 to 14 cm H2O   90th percentile pressure 10.2 cm H2O 10.5 cm H2O   Mask Dream Wear mask DreamWear mask     DME: [facility]        IMPRESSION AND RECOMMENDATIONS:     1. 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  -I reviewed his compliance data today personally.  His usage remains excellent and residual AHI is within normal limits.  -Since there is a doubt regarding his oxygen levels at nighttime, I will order for an overnight pulse oximetry while using CPAP therapy.  Based on the results I can let him know if he needs to bleed oxygen into his machine.  -No changes made to the pressure settings today.  -Advised him to continue changing his mask interface frequently in order to maintain adequate seal.      Return in about 1 year (around 2/22/2024) for olga.         Jose A Hoskins MD  Pulmonary Critical Care and Sleep Medicine  2/22/2023, 1:01 PM    This note was completed using dragon medical speech recognition software. Grammatical errors, random word insertions, pronoun errors and incomplete sentences are occasional consequences of this technology due to software limitations. If there are questions or concerns about the content of this note of information contained within the body of this dictation they should be addressed with the provider for clarification.

## 2023-03-21 ENCOUNTER — TELEPHONE (OUTPATIENT)
Dept: PULMONOLOGY | Age: 53
End: 2023-03-21

## 2023-11-06 ENCOUNTER — TELEPHONE (OUTPATIENT)
Dept: CARDIOLOGY CLINIC | Age: 53
End: 2023-11-06

## 2023-11-30 DIAGNOSIS — Z00.00 PERIODIC HEALTH ASSESSMENT, GENERAL SCREENING, ADULT: Primary | ICD-10-CM

## 2023-12-05 ENCOUNTER — HOSPITAL ENCOUNTER (OUTPATIENT)
Age: 53
Discharge: HOME | End: 2023-12-05
Payer: COMMERCIAL

## 2023-12-05 DIAGNOSIS — Z00.00 PERIODIC HEALTH ASSESSMENT, GENERAL SCREENING, ADULT: ICD-10-CM

## 2023-12-05 LAB
ANION GAP SERPL CALCULATED.3IONS-SCNC: 12 MMOL/L (ref 3–16)
BUN SERPL-MCNC: 24 MG/DL (ref 7–20)
CALCIUM SERPL-MCNC: 9.4 MG/DL (ref 8.3–10.6)
CHLORIDE SERPL-SCNC: 102 MMOL/L (ref 99–110)
CHOLEST SERPL-MCNC: 271 MG/DL (ref 0–199)
CO2 SERPL-SCNC: 24 MMOL/L (ref 21–32)
CREAT SERPL-MCNC: 0.8 MG/DL (ref 0.9–1.3)
EST. AVERAGE GLUCOSE BLD GHB EST-MCNC: 93.9 MG/DL
GFR SERPLBLD CREATININE-BSD FMLA CKD-EPI: >60 ML/MIN/{1.73_M2}
GLUCOSE SERPL-MCNC: 94 MG/DL (ref 70–99)
HBA1C MFR BLD: 4.9 %
HDLC SERPL-MCNC: 25 MG/DL (ref 40–60)
LDLC SERPL CALC-MCNC: ABNORMAL MG/DL
LDLC SERPL-MCNC: 150 MG/DL
POTASSIUM SERPL-SCNC: 4.5 MMOL/L (ref 3.5–5.1)
PSA SERPL DL<=0.01 NG/ML-MCNC: 4.56 NG/ML (ref 0–4)
SODIUM SERPL-SCNC: 138 MMOL/L (ref 136–145)
TRIGL SERPL-MCNC: 762 MG/DL (ref 0–150)
TSH SERPL DL<=0.005 MIU/L-ACNC: 1.69 UIU/ML (ref 0.27–4.2)
VLDLC SERPL CALC-MCNC: ABNORMAL MG/DL

## 2023-12-05 PROCEDURE — 83036 HEMOGLOBIN GLYCOSYLATED A1C: CPT

## 2023-12-05 PROCEDURE — 84153 ASSAY OF PSA TOTAL: CPT

## 2023-12-05 PROCEDURE — 80061 LIPID PANEL: CPT

## 2023-12-05 PROCEDURE — 80048 BASIC METABOLIC PNL TOTAL CA: CPT

## 2023-12-05 PROCEDURE — 84443 ASSAY THYROID STIM HORMONE: CPT

## 2023-12-05 PROCEDURE — 83721 ASSAY OF BLOOD LIPOPROTEIN: CPT

## 2023-12-05 PROCEDURE — 36415 COLL VENOUS BLD VENIPUNCTURE: CPT

## 2023-12-11 DIAGNOSIS — E78.1 HYPERTRIGLYCERIDEMIA: Primary | ICD-10-CM

## 2024-02-05 DIAGNOSIS — I10 HYPERTENSION, UNSPECIFIED TYPE: Primary | ICD-10-CM

## 2024-02-05 DIAGNOSIS — N40.0 BENIGN PROSTATIC HYPERPLASIA WITHOUT LOWER URINARY TRACT SYMPTOMS: ICD-10-CM

## 2024-02-07 ENCOUNTER — HOSPITAL ENCOUNTER (OUTPATIENT)
Age: 54
Discharge: HOME | End: 2024-02-07
Payer: COMMERCIAL

## 2024-02-07 DIAGNOSIS — I10 HYPERTENSION, UNSPECIFIED TYPE: ICD-10-CM

## 2024-02-07 DIAGNOSIS — E78.1 HYPERTRIGLYCERIDEMIA: ICD-10-CM

## 2024-02-07 DIAGNOSIS — N40.0 BENIGN PROSTATIC HYPERPLASIA WITHOUT LOWER URINARY TRACT SYMPTOMS: ICD-10-CM

## 2024-02-07 LAB
ALBUMIN SERPL-MCNC: 4.8 G/DL (ref 3.4–5)
ALBUMIN/GLOB SERPL: 2.3 {RATIO} (ref 1.1–2.2)
ALP SERPL-CCNC: 99 U/L (ref 40–129)
ALT SERPL-CCNC: 31 U/L (ref 10–40)
ANION GAP SERPL CALCULATED.3IONS-SCNC: 9 MMOL/L (ref 3–16)
AST SERPL-CCNC: 22 U/L (ref 15–37)
BILIRUB DIRECT SERPL-MCNC: <0.2 MG/DL (ref 0–0.3)
BILIRUB INDIRECT SERPL-MCNC: NORMAL MG/DL (ref 0–1)
BILIRUB SERPL-MCNC: 0.3 MG/DL (ref 0–1)
BUN SERPL-MCNC: 19 MG/DL (ref 7–20)
CALCIUM SERPL-MCNC: 9.3 MG/DL (ref 8.3–10.6)
CHLORIDE SERPL-SCNC: 105 MMOL/L (ref 99–110)
CHOLEST SERPL-MCNC: 92 MG/DL (ref 0–199)
CK SERPL-CCNC: 228 U/L (ref 39–308)
CO2 SERPL-SCNC: 26 MMOL/L (ref 21–32)
CREAT SERPL-MCNC: 0.9 MG/DL (ref 0.9–1.3)
GFR SERPLBLD CREATININE-BSD FMLA CKD-EPI: >60 ML/MIN/{1.73_M2}
GLUCOSE P FAST SERPL-MCNC: 92 MG/DL (ref 70–99)
HDLC SERPL-MCNC: 35 MG/DL (ref 40–60)
LDL CHOLESTEROL CALCULATED: 43 MG/DL
LIPASE SERPL-CCNC: 80 U/L (ref 13–60)
POTASSIUM SERPL-SCNC: 4.5 MMOL/L (ref 3.5–5.1)
PROT SERPL-MCNC: 6.9 G/DL (ref 6.4–8.2)
PSA SERPL DL<=0.01 NG/ML-MCNC: 3.78 NG/ML (ref 0–4)
SODIUM SERPL-SCNC: 140 MMOL/L (ref 136–145)
TRIGL SERPL-MCNC: 69 MG/DL (ref 0–150)
VLDLC SERPL CALC-MCNC: 14 MG/DL

## 2024-02-07 PROCEDURE — 80061 LIPID PANEL: CPT

## 2024-02-07 PROCEDURE — 84153 ASSAY OF PSA TOTAL: CPT

## 2024-02-07 PROCEDURE — 80053 COMPREHEN METABOLIC PANEL: CPT

## 2024-02-07 PROCEDURE — 36415 COLL VENOUS BLD VENIPUNCTURE: CPT

## 2024-02-07 PROCEDURE — 82550 ASSAY OF CK (CPK): CPT

## 2024-02-07 PROCEDURE — 83690 ASSAY OF LIPASE: CPT

## 2024-09-23 RX ORDER — CIPROFLOXACIN 500 MG/1
500 TABLET, FILM COATED ORAL 2 TIMES DAILY
Qty: 14 TABLET | Refills: 0 | Status: SHIPPED | OUTPATIENT
Start: 2024-09-23 | End: 2024-09-30

## 2024-11-15 DIAGNOSIS — N40.0 BENIGN PROSTATIC HYPERPLASIA WITHOUT LOWER URINARY TRACT SYMPTOMS: Primary | ICD-10-CM

## 2024-11-16 ENCOUNTER — HOSPITAL ENCOUNTER (OUTPATIENT)
Age: 54
Discharge: HOME | End: 2024-11-16
Payer: COMMERCIAL

## 2024-11-16 DIAGNOSIS — N40.0 BENIGN PROSTATIC HYPERPLASIA WITHOUT LOWER URINARY TRACT SYMPTOMS: ICD-10-CM

## 2024-11-16 LAB
ALBUMIN SERPL-MCNC: 4.6 G/DL (ref 3.4–5)
ALP SERPL-CCNC: 71 U/L (ref 40–129)
ALT SERPL-CCNC: 31 U/L (ref 10–40)
ANION GAP SERPL CALCULATED.3IONS-SCNC: 10 MMOL/L (ref 3–16)
AST SERPL-CCNC: 24 U/L (ref 15–37)
BILIRUB DIRECT SERPL-MCNC: 0.3 MG/DL (ref 0–0.3)
BILIRUB INDIRECT SERPL-MCNC: 0.4 MG/DL (ref 0–1)
BILIRUB SERPL-MCNC: 0.7 MG/DL (ref 0–1)
BUN SERPL-MCNC: 22 MG/DL (ref 7–20)
CALCIUM SERPL-MCNC: 9.3 MG/DL (ref 8.3–10.6)
CHLORIDE SERPL-SCNC: 104 MMOL/L (ref 99–110)
CHOLEST SERPL-MCNC: 155 MG/DL (ref 0–199)
CO2 SERPL-SCNC: 23 MMOL/L (ref 21–32)
CREAT SERPL-MCNC: 0.9 MG/DL (ref 0.9–1.3)
GFR SERPLBLD CREATININE-BSD FMLA CKD-EPI: >90 ML/MIN/{1.73_M2}
GLUCOSE SERPL-MCNC: 87 MG/DL (ref 70–99)
HDLC SERPL-MCNC: 35 MG/DL (ref 40–60)
LDLC SERPL CALC-MCNC: 108 MG/DL
POTASSIUM SERPL-SCNC: 4.1 MMOL/L (ref 3.5–5.1)
PROT SERPL-MCNC: 7.1 G/DL (ref 6.4–8.2)
PSA SERPL DL<=0.01 NG/ML-MCNC: 4.83 NG/ML (ref 0–4)
SODIUM SERPL-SCNC: 137 MMOL/L (ref 136–145)
TRIGL SERPL-MCNC: 59 MG/DL (ref 0–150)
VLDLC SERPL CALC-MCNC: 12 MG/DL

## 2024-11-16 PROCEDURE — 80048 BASIC METABOLIC PNL TOTAL CA: CPT

## 2024-11-16 PROCEDURE — 80076 HEPATIC FUNCTION PANEL: CPT

## 2024-11-16 PROCEDURE — 84153 ASSAY OF PSA TOTAL: CPT

## 2024-11-16 PROCEDURE — 80061 LIPID PANEL: CPT

## 2024-11-16 PROCEDURE — 36415 COLL VENOUS BLD VENIPUNCTURE: CPT

## 2024-11-19 ENCOUNTER — HOSPITAL ENCOUNTER (OUTPATIENT)
Age: 54
Discharge: HOME | End: 2024-11-19
Payer: COMMERCIAL

## 2024-11-19 DIAGNOSIS — Z11.3 ENCOUNTER FOR SCREENING EXAMINATION FOR SEXUALLY TRANSMITTED DISEASE: Primary | ICD-10-CM

## 2024-11-19 DIAGNOSIS — Z11.3 ENCOUNTER FOR SCREENING EXAMINATION FOR SEXUALLY TRANSMITTED DISEASE: ICD-10-CM

## 2024-11-19 LAB — HCV AB SERPL QL IA: NORMAL

## 2024-11-19 PROCEDURE — 36415 COLL VENOUS BLD VENIPUNCTURE: CPT

## 2024-11-19 PROCEDURE — 87350 HEPATITIS BE AG IA: CPT

## 2024-11-19 PROCEDURE — 87390 HIV-1 AG IA: CPT

## 2024-11-19 PROCEDURE — 86707 HEPATITIS BE ANTIBODY: CPT

## 2024-11-19 PROCEDURE — 86701 HIV-1ANTIBODY: CPT

## 2024-11-19 PROCEDURE — 86803 HEPATITIS C AB TEST: CPT

## 2024-11-19 PROCEDURE — 86702 HIV-2 ANTIBODY: CPT

## 2024-11-20 LAB
HBV E AB SERPL QL IA: NEGATIVE
HIV 1+2 AB+HIV1 P24 AG SERPL QL IA: NORMAL
HIV 2 AB SERPL QL IA: NORMAL
HIV1 AB SERPL QL IA: NORMAL
HIV1 P24 AG SERPL QL IA: NORMAL

## 2024-11-21 LAB — HBV E AG SERPL QL IA: NEGATIVE

## 2024-12-30 RX ORDER — AZITHROMYCIN 250 MG/1
TABLET, FILM COATED ORAL
Qty: 6 TABLET | Refills: 0 | Status: SHIPPED | OUTPATIENT
Start: 2024-12-30 | End: 2025-01-09

## 2025-02-05 ENCOUNTER — PATIENT MESSAGE (OUTPATIENT)
Dept: INTERNAL MEDICINE CLINIC | Age: 55
End: 2025-02-05
Payer: COMMERCIAL

## 2025-02-05 DIAGNOSIS — J11.1 FLU SYNDROME: Primary | ICD-10-CM

## 2025-02-05 PROCEDURE — 87428 SARSCOV & INF VIR A&B AG IA: CPT | Performed by: INTERNAL MEDICINE

## 2025-02-07 ENCOUNTER — HOSPITAL ENCOUNTER (OUTPATIENT)
Age: 55
Discharge: HOME | End: 2025-02-07
Payer: COMMERCIAL

## 2025-02-07 DIAGNOSIS — J11.1 FLU: Primary | ICD-10-CM

## 2025-02-07 DIAGNOSIS — R53.83 OTHER FATIGUE: Primary | ICD-10-CM

## 2025-02-07 LAB
FLUAV RNA RESP QL NAA+PROBE: NOT DETECTED
FLUBV RNA RESP QL NAA+PROBE: NOT DETECTED
SARS-COV-2 RNA RESP QL NAA+PROBE: NOT DETECTED

## 2025-02-07 PROCEDURE — 87636 SARSCOV2 & INF A&B AMP PRB: CPT

## 2025-02-07 RX ORDER — OSELTAMIVIR PHOSPHATE 75 MG/1
75 CAPSULE ORAL 2 TIMES DAILY
Qty: 10 CAPSULE | Refills: 0 | Status: SHIPPED | OUTPATIENT
Start: 2025-02-07 | End: 2025-02-12

## 2025-02-10 ENCOUNTER — HOSPITAL ENCOUNTER (OUTPATIENT)
Age: 55
Discharge: HOME | End: 2025-02-10
Payer: COMMERCIAL

## 2025-02-10 DIAGNOSIS — R53.83 OTHER FATIGUE: ICD-10-CM

## 2025-02-10 LAB
FLUAV RNA UPPER RESP QL NAA+PROBE: NEGATIVE
FLUBV AG NPH QL: NEGATIVE

## 2025-02-10 PROCEDURE — 87804 INFLUENZA ASSAY W/OPTIC: CPT

## 2025-02-17 ENCOUNTER — HOSPITAL ENCOUNTER (OUTPATIENT)
Age: 55
Discharge: HOME | End: 2025-02-17
Payer: COMMERCIAL

## 2025-02-17 DIAGNOSIS — R05.1 ACUTE COUGH: ICD-10-CM

## 2025-02-17 DIAGNOSIS — R09.89 CHEST CONGESTION: Primary | ICD-10-CM

## 2025-02-17 DIAGNOSIS — R09.89 CHEST CONGESTION: ICD-10-CM

## 2025-02-17 LAB
REPORT: NORMAL
RESP PATH DNA+RNA PNL NPH NAA+NON-PROBE: NORMAL

## 2025-02-17 PROCEDURE — 0202U NFCT DS 22 TRGT SARS-COV-2: CPT

## 2025-03-10 LAB
CHOLEST SERPL-MCNC: 143 MG/DL (ref 0–199)
GLUCOSE SERPL-MCNC: 95 MG/DL (ref 70–99)
HDLC SERPL-MCNC: 39 MG/DL (ref 40–60)
LDLC SERPL CALC-MCNC: 81 MG/DL
TRIGL SERPL-MCNC: 113 MG/DL (ref 0–150)

## 2025-04-17 RX ORDER — LOSARTAN POTASSIUM 100 MG/1
100 TABLET ORAL DAILY
Qty: 90 TABLET | Refills: 3 | Status: SHIPPED | OUTPATIENT
Start: 2025-04-17

## 2025-05-23 ENCOUNTER — HOSPITAL ENCOUNTER (OUTPATIENT)
Age: 55
Discharge: HOME | End: 2025-05-23

## 2025-05-23 DIAGNOSIS — Z20.822 EXPOSURE TO COVID-19 VIRUS: Primary | ICD-10-CM

## 2025-05-23 LAB — SARS-COV-2 RDRP RESP QL NAA+PROBE: NOT DETECTED

## 2025-06-06 ENCOUNTER — NURSE TRIAGE (OUTPATIENT)
Dept: OTHER | Facility: CLINIC | Age: 55
End: 2025-06-06

## 2025-06-06 NOTE — TELEPHONE ENCOUNTER
Reason for Disposition   EXPOSURE of eye, mouth, or other mucous membrane and with blood or body fluid containing visible blood    Answer Assessment - Initial Assessment Questions  1. DESCRIPTION: \"Please describe what happened?\" (e.g., spitting, splash, touching)      Blood in eye from pulling catheter from groin after heart cath  2. TYPE AND AMOUNT OF FLUID:  \"What type of body fluid was it and how much? (e.g., saliva, blood, vomit; a few drops, a splash)       Blood in right eye   3. ROUTE OF EXPOSURE: \"What part of your body was exposed?\" (e.g., eye, mouth, intact skin)      Eye  4. DATE-TIME of EXPOSURE: \"When did this exposure occur?\"      06/06/2025 0907  5. SOURCE PERSON HIV POSITIVE: \"Is the other person HIV positive?\" (e.g., yes, no, unknown)      No  6. SOURCE PERSON HEPATITIS POSITIVE: \"Is the other person Hepatitis positive?\" (e.g., yes, no, unknown)      No    Protocols used: Blood and Body Fluid Exposure-ADULT-OH

## 2025-06-13 DIAGNOSIS — N40.0 PROSTATE HYPERTROPHY: Primary | ICD-10-CM
